# Patient Record
Sex: MALE | Race: WHITE | NOT HISPANIC OR LATINO | Employment: OTHER | URBAN - METROPOLITAN AREA
[De-identification: names, ages, dates, MRNs, and addresses within clinical notes are randomized per-mention and may not be internally consistent; named-entity substitution may affect disease eponyms.]

---

## 2017-01-10 ENCOUNTER — APPOINTMENT (OUTPATIENT)
Dept: SLEEP MEDICINE | Facility: MEDICAL CENTER | Age: 72
End: 2017-01-10
Attending: INTERNAL MEDICINE

## 2017-01-12 ENCOUNTER — TELEPHONE (OUTPATIENT)
Dept: CARDIOLOGY | Facility: MEDICAL CENTER | Age: 72
End: 2017-01-12

## 2017-01-12 NOTE — TELEPHONE ENCOUNTER
I called the patient at [phone] (H) and left a voicemail asking the patient if he has had recent labs done (as per visit with Dr. Reina) for his upcoming appointment with [facility] 1/17/17 @ 1400 check-in. I asked the patient to call me back with this information.LESLIE.

## 2017-12-09 ENCOUNTER — APPOINTMENT (OUTPATIENT)
Dept: RADIOLOGY | Facility: MEDICAL CENTER | Age: 72
DRG: 291 | End: 2017-12-09
Attending: EMERGENCY MEDICINE
Payer: MEDICARE

## 2017-12-09 ENCOUNTER — HOSPITAL ENCOUNTER (INPATIENT)
Facility: MEDICAL CENTER | Age: 72
LOS: 1 days | DRG: 291 | End: 2017-12-12
Attending: EMERGENCY MEDICINE | Admitting: INTERNAL MEDICINE
Payer: MEDICARE

## 2017-12-09 ENCOUNTER — RESOLUTE PROFESSIONAL BILLING HOSPITAL PROF FEE (OUTPATIENT)
Dept: HOSPITALIST | Facility: MEDICAL CENTER | Age: 72
End: 2017-12-09

## 2017-12-09 DIAGNOSIS — I27.82 OTHER CHRONIC PULMONARY EMBOLISM WITHOUT ACUTE COR PULMONALE (HCC): ICD-10-CM

## 2017-12-09 DIAGNOSIS — I26.99 OTHER ACUTE PULMONARY EMBOLISM WITHOUT ACUTE COR PULMONALE (HCC): ICD-10-CM

## 2017-12-09 DIAGNOSIS — E78.5 DYSLIPIDEMIA: ICD-10-CM

## 2017-12-09 DIAGNOSIS — R06.02 SOB (SHORTNESS OF BREATH): ICD-10-CM

## 2017-12-09 DIAGNOSIS — I50.9 CONGESTIVE HEART FAILURE, UNSPECIFIED CONGESTIVE HEART FAILURE CHRONICITY, UNSPECIFIED CONGESTIVE HEART FAILURE TYPE: ICD-10-CM

## 2017-12-09 LAB
ALBUMIN SERPL BCP-MCNC: 3.8 G/DL (ref 3.2–4.9)
ALBUMIN/GLOB SERPL: 1.9 G/DL
ALP SERPL-CCNC: 61 U/L (ref 30–99)
ALT SERPL-CCNC: 63 U/L (ref 2–50)
ANION GAP SERPL CALC-SCNC: 11 MMOL/L (ref 0–11.9)
AST SERPL-CCNC: 27 U/L (ref 12–45)
BASOPHILS # BLD AUTO: 0.5 % (ref 0–1.8)
BASOPHILS # BLD: 0.05 K/UL (ref 0–0.12)
BILIRUB SERPL-MCNC: 1.9 MG/DL (ref 0.1–1.5)
BNP SERPL-MCNC: 2513 PG/ML (ref 0–100)
BUN SERPL-MCNC: 30 MG/DL (ref 8–22)
CALCIUM SERPL-MCNC: 8.9 MG/DL (ref 8.5–10.5)
CHLORIDE SERPL-SCNC: 108 MMOL/L (ref 96–112)
CO2 SERPL-SCNC: 22 MMOL/L (ref 20–33)
CREAT SERPL-MCNC: 1.24 MG/DL (ref 0.5–1.4)
DEPRECATED D DIMER PPP IA-ACNC: 325 NG/ML(D-DU)
EKG IMPRESSION: NORMAL
EOSINOPHIL # BLD AUTO: 0.1 K/UL (ref 0–0.51)
EOSINOPHIL NFR BLD: 1 % (ref 0–6.9)
ERYTHROCYTE [DISTWIDTH] IN BLOOD BY AUTOMATED COUNT: 44.5 FL (ref 35.9–50)
FLUAV RNA SPEC QL NAA+PROBE: NEGATIVE
FLUBV RNA SPEC QL NAA+PROBE: NEGATIVE
GFR SERPL CREATININE-BSD FRML MDRD: 58 ML/MIN/1.73 M 2
GLOBULIN SER CALC-MCNC: 2 G/DL (ref 1.9–3.5)
GLUCOSE SERPL-MCNC: 185 MG/DL (ref 65–99)
HCT VFR BLD AUTO: 48.8 % (ref 42–52)
HGB BLD-MCNC: 16.7 G/DL (ref 14–18)
IMM GRANULOCYTES # BLD AUTO: 0.05 K/UL (ref 0–0.11)
IMM GRANULOCYTES NFR BLD AUTO: 0.5 % (ref 0–0.9)
LIPASE SERPL-CCNC: 47 U/L (ref 11–82)
LYMPHOCYTES # BLD AUTO: 2.54 K/UL (ref 1–4.8)
LYMPHOCYTES NFR BLD: 25.6 % (ref 22–41)
MCH RBC QN AUTO: 30.9 PG (ref 27–33)
MCHC RBC AUTO-ENTMCNC: 34.2 G/DL (ref 33.7–35.3)
MCV RBC AUTO: 90.4 FL (ref 81.4–97.8)
MONOCYTES # BLD AUTO: 0.75 K/UL (ref 0–0.85)
MONOCYTES NFR BLD AUTO: 7.6 % (ref 0–13.4)
NEUTROPHILS # BLD AUTO: 6.44 K/UL (ref 1.82–7.42)
NEUTROPHILS NFR BLD: 64.8 % (ref 44–72)
NRBC # BLD AUTO: 0 K/UL
NRBC BLD AUTO-RTO: 0 /100 WBC
PLATELET # BLD AUTO: 175 K/UL (ref 164–446)
PMV BLD AUTO: 11.2 FL (ref 9–12.9)
POTASSIUM SERPL-SCNC: 4.1 MMOL/L (ref 3.6–5.5)
PROT SERPL-MCNC: 5.8 G/DL (ref 6–8.2)
RBC # BLD AUTO: 5.4 M/UL (ref 4.7–6.1)
SODIUM SERPL-SCNC: 141 MMOL/L (ref 135–145)
TROPONIN I SERPL-MCNC: 0.21 NG/ML (ref 0–0.04)
TROPONIN I SERPL-MCNC: 0.24 NG/ML (ref 0–0.04)
WBC # BLD AUTO: 9.9 K/UL (ref 4.8–10.8)

## 2017-12-09 PROCEDURE — 99285 EMERGENCY DEPT VISIT HI MDM: CPT

## 2017-12-09 PROCEDURE — 90471 IMMUNIZATION ADMIN: CPT

## 2017-12-09 PROCEDURE — 36415 COLL VENOUS BLD VENIPUNCTURE: CPT

## 2017-12-09 PROCEDURE — 700117 HCHG RX CONTRAST REV CODE 255: Performed by: EMERGENCY MEDICINE

## 2017-12-09 PROCEDURE — 99220 PR INITIAL OBSERVATION CARE,LEVL III: CPT | Performed by: INTERNAL MEDICINE

## 2017-12-09 PROCEDURE — 83880 ASSAY OF NATRIURETIC PEPTIDE: CPT

## 2017-12-09 PROCEDURE — G0378 HOSPITAL OBSERVATION PER HR: HCPCS

## 2017-12-09 PROCEDURE — 90670 PCV13 VACCINE IM: CPT | Performed by: INTERNAL MEDICINE

## 2017-12-09 PROCEDURE — 85025 COMPLETE CBC W/AUTO DIFF WBC: CPT

## 2017-12-09 PROCEDURE — 80053 COMPREHEN METABOLIC PANEL: CPT

## 2017-12-09 PROCEDURE — 700111 HCHG RX REV CODE 636 W/ 250 OVERRIDE (IP): Performed by: INTERNAL MEDICINE

## 2017-12-09 PROCEDURE — 700101 HCHG RX REV CODE 250: Performed by: EMERGENCY MEDICINE

## 2017-12-09 PROCEDURE — 87502 INFLUENZA DNA AMP PROBE: CPT

## 2017-12-09 PROCEDURE — 83690 ASSAY OF LIPASE: CPT

## 2017-12-09 PROCEDURE — 700102 HCHG RX REV CODE 250 W/ 637 OVERRIDE(OP): Performed by: INTERNAL MEDICINE

## 2017-12-09 PROCEDURE — 71010 DX-CHEST-PORTABLE (1 VIEW): CPT

## 2017-12-09 PROCEDURE — 71275 CT ANGIOGRAPHY CHEST: CPT

## 2017-12-09 PROCEDURE — 94640 AIRWAY INHALATION TREATMENT: CPT

## 2017-12-09 PROCEDURE — 93005 ELECTROCARDIOGRAM TRACING: CPT | Performed by: EMERGENCY MEDICINE

## 2017-12-09 PROCEDURE — 85379 FIBRIN DEGRADATION QUANT: CPT

## 2017-12-09 PROCEDURE — A9270 NON-COVERED ITEM OR SERVICE: HCPCS | Performed by: INTERNAL MEDICINE

## 2017-12-09 PROCEDURE — 84484 ASSAY OF TROPONIN QUANT: CPT

## 2017-12-09 PROCEDURE — 96374 THER/PROPH/DIAG INJ IV PUSH: CPT

## 2017-12-09 RX ORDER — IPRATROPIUM BROMIDE AND ALBUTEROL SULFATE 2.5; .5 MG/3ML; MG/3ML
3 SOLUTION RESPIRATORY (INHALATION)
Status: DISCONTINUED | OUTPATIENT
Start: 2017-12-09 | End: 2017-12-12 | Stop reason: HOSPADM

## 2017-12-09 RX ORDER — BISACODYL 10 MG
10 SUPPOSITORY, RECTAL RECTAL
Status: DISCONTINUED | OUTPATIENT
Start: 2017-12-09 | End: 2017-12-12 | Stop reason: HOSPADM

## 2017-12-09 RX ORDER — GUAIFENESIN 600 MG/1
600 TABLET, EXTENDED RELEASE ORAL 2 TIMES DAILY PRN
COMMUNITY

## 2017-12-09 RX ORDER — ATORVASTATIN CALCIUM 40 MG/1
40 TABLET, FILM COATED ORAL
Status: DISCONTINUED | OUTPATIENT
Start: 2017-12-09 | End: 2017-12-12 | Stop reason: HOSPADM

## 2017-12-09 RX ORDER — FUROSEMIDE 80 MG
40 TABLET ORAL EVERY MORNING
COMMUNITY

## 2017-12-09 RX ORDER — AMOXICILLIN 500 MG/1
200 CAPSULE ORAL 2 TIMES DAILY
Status: ON HOLD | COMMUNITY
End: 2017-12-12

## 2017-12-09 RX ORDER — ENALAPRIL MALEATE 10 MG/1
20 TABLET ORAL EVERY 12 HOURS
Status: DISCONTINUED | OUTPATIENT
Start: 2017-12-09 | End: 2017-12-10

## 2017-12-09 RX ORDER — FUROSEMIDE 10 MG/ML
40 INJECTION INTRAMUSCULAR; INTRAVENOUS
Status: DISCONTINUED | OUTPATIENT
Start: 2017-12-09 | End: 2017-12-12

## 2017-12-09 RX ORDER — CARVEDILOL 25 MG/1
25 TABLET ORAL 2 TIMES DAILY WITH MEALS
Status: DISCONTINUED | OUTPATIENT
Start: 2017-12-09 | End: 2017-12-10

## 2017-12-09 RX ORDER — POLYETHYLENE GLYCOL 3350 17 G/17G
1 POWDER, FOR SOLUTION ORAL
Status: DISCONTINUED | OUTPATIENT
Start: 2017-12-09 | End: 2017-12-12 | Stop reason: HOSPADM

## 2017-12-09 RX ORDER — ASPIRIN 300 MG/1
300 SUPPOSITORY RECTAL DAILY
Status: DISCONTINUED | OUTPATIENT
Start: 2017-12-09 | End: 2017-12-09

## 2017-12-09 RX ORDER — AMOXICILLIN 250 MG
2 CAPSULE ORAL 2 TIMES DAILY
Status: DISCONTINUED | OUTPATIENT
Start: 2017-12-09 | End: 2017-12-12 | Stop reason: HOSPADM

## 2017-12-09 RX ORDER — FUROSEMIDE 10 MG/ML
20 INJECTION INTRAMUSCULAR; INTRAVENOUS ONCE
Status: DISCONTINUED | OUTPATIENT
Start: 2017-12-09 | End: 2017-12-09

## 2017-12-09 RX ORDER — POTASSIUM CHLORIDE 20 MEQ/1
20 TABLET, EXTENDED RELEASE ORAL DAILY
Status: DISCONTINUED | OUTPATIENT
Start: 2017-12-10 | End: 2017-12-12

## 2017-12-09 RX ORDER — ASPIRIN 325 MG
325 TABLET ORAL DAILY
Status: DISCONTINUED | OUTPATIENT
Start: 2017-12-09 | End: 2017-12-09

## 2017-12-09 RX ORDER — ASPIRIN 81 MG/1
324 TABLET, CHEWABLE ORAL DAILY
Status: DISCONTINUED | OUTPATIENT
Start: 2017-12-09 | End: 2017-12-09

## 2017-12-09 RX ADMIN — IPRATROPIUM BROMIDE AND ALBUTEROL SULFATE 3 ML: .5; 3 SOLUTION RESPIRATORY (INHALATION) at 15:29

## 2017-12-09 RX ADMIN — ASPIRIN 325 MG: 325 TABLET, COATED ORAL at 20:09

## 2017-12-09 RX ADMIN — PNEUMOCOCCAL 13-VALENT CONJUGATE VACCINE 0.5 ML: 2.2; 2.2; 2.2; 2.2; 2.2; 4.4; 2.2; 2.2; 2.2; 2.2; 2.2; 2.2; 2.2 INJECTION, SUSPENSION INTRAMUSCULAR at 21:53

## 2017-12-09 RX ADMIN — ATORVASTATIN CALCIUM 40 MG: 40 TABLET, FILM COATED ORAL at 20:09

## 2017-12-09 RX ADMIN — FUROSEMIDE 40 MG: 10 INJECTION, SOLUTION INTRAMUSCULAR; INTRAVENOUS at 17:03

## 2017-12-09 RX ADMIN — IOHEXOL 65 ML: 350 INJECTION, SOLUTION INTRAVENOUS at 19:16

## 2017-12-09 RX ADMIN — CARVEDILOL 25 MG: 25 TABLET, FILM COATED ORAL at 20:09

## 2017-12-09 RX ADMIN — ENALAPRIL MALEATE 20 MG: 10 TABLET ORAL at 20:09

## 2017-12-09 RX ADMIN — ENOXAPARIN SODIUM 100 MG: 100 INJECTION SUBCUTANEOUS at 21:53

## 2017-12-09 ASSESSMENT — COGNITIVE AND FUNCTIONAL STATUS - GENERAL
DAILY ACTIVITIY SCORE: 24
SUGGESTED CMS G CODE MODIFIER MOBILITY: CH
SUGGESTED CMS G CODE MODIFIER DAILY ACTIVITY: CH
MOBILITY SCORE: 24

## 2017-12-09 ASSESSMENT — ENCOUNTER SYMPTOMS
CONSTIPATION: 0
WEAKNESS: 0
FALLS: 0
TREMORS: 0
NAUSEA: 0
WHEEZING: 0
DIARRHEA: 0
COUGH: 0
HEADACHES: 0
NERVOUS/ANXIOUS: 0
EYE PAIN: 0
MYALGIAS: 0
INSOMNIA: 0
PALPITATIONS: 0
FEVER: 0
VOMITING: 0
SHORTNESS OF BREATH: 1
LOSS OF CONSCIOUSNESS: 0
HEMOPTYSIS: 0
DIZZINESS: 0
ABDOMINAL PAIN: 0
FOCAL WEAKNESS: 0
SEIZURES: 0
BLOOD IN STOOL: 0
EYE REDNESS: 0
CHILLS: 0
ORTHOPNEA: 1

## 2017-12-09 ASSESSMENT — LIFESTYLE VARIABLES
HOW MANY TIMES IN THE PAST YEAR HAVE YOU HAD 5 OR MORE DRINKS IN A DAY: 0
CONSUMPTION TOTAL: NEGATIVE
TOTAL SCORE: 0
TOTAL SCORE: 0
HAVE PEOPLE ANNOYED YOU BY CRITICIZING YOUR DRINKING: NO
DO YOU DRINK ALCOHOL: YES
TOTAL SCORE: 0
EVER_SMOKED: YES
AVERAGE NUMBER OF DAYS PER WEEK YOU HAVE A DRINK CONTAINING ALCOHOL: 1
HAVE YOU EVER FELT YOU SHOULD CUT DOWN ON YOUR DRINKING: NO
EVER HAD A DRINK FIRST THING IN THE MORNING TO STEADY YOUR NERVES TO GET RID OF A HANGOVER: NO
ON A TYPICAL DAY WHEN YOU DRINK ALCOHOL HOW MANY DRINKS DO YOU HAVE: 1
EVER FELT BAD OR GUILTY ABOUT YOUR DRINKING: NO

## 2017-12-09 ASSESSMENT — PATIENT HEALTH QUESTIONNAIRE - PHQ9
SUM OF ALL RESPONSES TO PHQ QUESTIONS 1-9: 0
2. FEELING DOWN, DEPRESSED, IRRITABLE, OR HOPELESS: NOT AT ALL
1. LITTLE INTEREST OR PLEASURE IN DOING THINGS: NOT AT ALL
SUM OF ALL RESPONSES TO PHQ9 QUESTIONS 1 AND 2: 0

## 2017-12-09 ASSESSMENT — PAIN SCALES - GENERAL
PAINLEVEL_OUTOF10: 3
PAINLEVEL_OUTOF10: 0

## 2017-12-09 NOTE — FLOWSHEET NOTE
12/09/17 1531   Interdisciplinary Plan of Care-Goals (Indications)   Obstructive Ventilatory Defect or Pulmonary Disease without Obvious Obstruction History / Diagnosis   Interdisciplinary Plan of Care-Outcomes    Bronchodilator Outcome Improved Vital Signs and Measures of Gas Exchange   Education   Education Yes - Pt. / Family has been Instructed in use of Respiratory Equipment   RT Assessment of Delivered Medications   Evaluation of Medication Delivery Daily Yes-- Pt /Family has been Instructed in use of Respiratory Medications and Adverse Reactions   SVN Group   #SVN Performed Yes   Given By: Mouthpiece   Date SVN Last Changed 12/09/17   Date SVN Next Change Due (Q 7 Days) 12/16/17   Respiratory WDL   Respiratory (WDL) X   Chest Exam   Respiration (!) 26   Pulse (!) 107   Heart Rate (Monitored) (!) 110   Breath Sounds   RUL Breath Sounds Clear   RML Breath Sounds Clear   RLL Breath Sounds Clear   JEREMY Breath Sounds Clear   LLL Breath Sounds Clear   Oxygen   Pulse Oximetry 94 %   O2 (LPM) 0   O2 (FiO2) 21   O2 Daily Delivery Respiratory  Room Air with O2 Available

## 2017-12-09 NOTE — ED NOTES
"Pt ambulates to triage  Chief Complaint   Patient presents with   • Congestion     no relief with mucinex - not wheezing   • Hoarse   • Abdominal Pain     \"upset\" x 3-4 days   Pt asked to wait in lobby, pt updated on triage process and pt asked to inform RN of any changes.     "

## 2017-12-09 NOTE — ED PROVIDER NOTES
"ED Provider Note    CHIEF COMPLAINT  Chief Complaint   Patient presents with   • Congestion     no relief with mucinex - not wheezing   • Hoarse   • Abdominal Pain     \"upset\" x 3-4 days       HPI  Vasiliy Rm is a 72 y.o. male who presents withShortness of breath has been going on for 1 week now. He has history of COPD and congestive heart failure. He's had this shortness of breath in the past unsure which cause is causing it today. He has dyspnea on exertion no chest pain no new arm or neck or back pain but his legs feel tired. He has a nonproductive cough no fever no chills no nausea vomiting currently but he was nausea for 2 days in a row. He also developed midabdominal pain for the past 4-5 days it's achy type pain intermittent S makes any better or worse he's had before. Currently he is pain-free. He also has some increased leg swelling. No new leg pain. All other systems are negative    REVIEW OF SYSTEMS  See HPI for further details    PAST MEDICAL HISTORY  Past Medical History:   Diagnosis Date   • Chickenpox    • COPD (chronic obstructive pulmonary disease) (CMS-HCC)    • Hyperlipidemia    • Hypertension    • Sleep apnea    • Smallpox        FAMILY HISTORY  Family History   Problem Relation Age of Onset   • Sleep Apnea Brother        SOCIAL HISTORY  Social History     Social History   • Marital status:      Spouse name: N/A   • Number of children: N/A   • Years of education: N/A     Social History Main Topics   • Smoking status: Former Smoker     Packs/day: 0.50     Years: 5.00     Types: Cigarettes, Pipe, Cigars     Quit date: 1/1/1972   • Smokeless tobacco: Never Used   • Alcohol use 0.0 - 1.2 oz/week      Comment: once a week   • Drug use: No   • Sexual activity: Not on file     Other Topics Concern   • Not on file     Social History Narrative   • No narrative on file       SURGICAL HISTORY  Past Surgical History:   Procedure Laterality Date   • OTHER ORTHOPEDIC SURGERY      thumb   • " "TONSILLECTOMY         CURRENT MEDICATIONS  Home Medications    **Home medications have not yet been reviewed for this encounter**         ALLERGIES  Allergies   Allergen Reactions   • Broccoli [Brassica Oleracea Italica (Broccoli)] Contact Dermatitis       PHYSICAL EXAM  VITAL SIGNS: /108   Pulse (!) 120   Temp 36.2 °C (97.2 °F)   Resp 20   Ht 1.778 m (5' 10\")   Wt 102.3 kg (225 lb 8.5 oz)   SpO2 96%   BMI 32.36 kg/m²     Constitutional: Patient is alert and oriented x3 inMild to moderate respiratory distress   HENT: Moist mucous membranes  Eyes: No conjunctivitis or icterus  Neck: Trachea is midline  Lymphatic: Negative anterior cervical adenopathy  Cardiovascular: Tachycardic regular no murmur  Thorax & Lungs: Scattered rhonchi  Abdomen: Morbidly obese nontender he does have what appears to be a right lower umbilical hernia unreducible he said it for years  Neurologic: Normal motor sensation  Extremities: 1-2+ pretibial edema  Psychiatric: Affect normal, Judgment normal, Mood normal.     EKG: Normal sinus rhythm rate of 107 with a normal corrected QT interval QRS leftward axis T wave inversion anterolaterally nondiagnostic ST elevation anteriorly EKG is unchanged from previous done in September 2016    Results for orders placed or performed during the hospital encounter of 12/09/17   CBC WITH DIFFERENTIAL   Result Value Ref Range    WBC 9.9 4.8 - 10.8 K/uL    RBC 5.40 4.70 - 6.10 M/uL    Hemoglobin 16.7 14.0 - 18.0 g/dL    Hematocrit 48.8 42.0 - 52.0 %    MCV 90.4 81.4 - 97.8 fL    MCH 30.9 27.0 - 33.0 pg    MCHC 34.2 33.7 - 35.3 g/dL    RDW 44.5 35.9 - 50.0 fL    Platelet Count 175 164 - 446 K/uL    MPV 11.2 9.0 - 12.9 fL    Neutrophils-Polys 64.80 44.00 - 72.00 %    Lymphocytes 25.60 22.00 - 41.00 %    Monocytes 7.60 0.00 - 13.40 %    Eosinophils 1.00 0.00 - 6.90 %    Basophils 0.50 0.00 - 1.80 %    Immature Granulocytes 0.50 0.00 - 0.90 %    Nucleated RBC 0.00 /100 WBC    Neutrophils (Absolute) " 6.44 1.82 - 7.42 K/uL    Lymphs (Absolute) 2.54 1.00 - 4.80 K/uL    Monos (Absolute) 0.75 0.00 - 0.85 K/uL    Eos (Absolute) 0.10 0.00 - 0.51 K/uL    Baso (Absolute) 0.05 0.00 - 0.12 K/uL    Immature Granulocytes (abs) 0.05 0.00 - 0.11 K/uL    NRBC (Absolute) 0.00 K/uL   COMP METABOLIC PANEL   Result Value Ref Range    Sodium 141 135 - 145 mmol/L    Potassium 4.1 3.6 - 5.5 mmol/L    Chloride 108 96 - 112 mmol/L    Co2 22 20 - 33 mmol/L    Anion Gap 11.0 0.0 - 11.9    Glucose 185 (H) 65 - 99 mg/dL    Bun 30 (H) 8 - 22 mg/dL    Creatinine 1.24 0.50 - 1.40 mg/dL    Calcium 8.9 8.5 - 10.5 mg/dL    AST(SGOT) 27 12 - 45 U/L    ALT(SGPT) 63 (H) 2 - 50 U/L    Alkaline Phosphatase 61 30 - 99 U/L    Total Bilirubin 1.9 (H) 0.1 - 1.5 mg/dL    Albumin 3.8 3.2 - 4.9 g/dL    Total Protein 5.8 (L) 6.0 - 8.2 g/dL    Globulin 2.0 1.9 - 3.5 g/dL    A-G Ratio 1.9 g/dL   TROPONIN   Result Value Ref Range    Troponin I 0.24 (H) 0.00 - 0.04 ng/mL   LIPASE   Result Value Ref Range    Lipase 47 11 - 82 U/L   BTYPE NATRIURETIC PEPTIDE   Result Value Ref Range    B Natriuretic Peptide 2513 (H) 0 - 100 pg/mL   ESTIMATED GFR   Result Value Ref Range    GFR If African American >60 >60 mL/min/1.73 m 2    GFR If Non African American 58 (A) >60 mL/min/1.73 m 2   INFLUENZA A/B BY PCR   Result Value Ref Range    Influenza virus A RNA Negative Negative    Influenza virus B, PCR Negative Negative   D-DIMER   Result Value Ref Range    D-Dimer Screen 325 (H) <250 ng/mL(D-DU)   Troponin in four (4) hours   Result Value Ref Range    Troponin I 0.21 (H) 0.00 - 0.04 ng/mL   EKG (NOW)   Result Value Ref Range    Report       [facility] Emergency Dept.    Test Date:  2017  Pt Name:    JONATHAN DOLL              Department: ER  MRN:        [id number]                      Room:       [facility]  Gender:     M                            Technician: [id number]  :        [id number]                   Requested By:LONNIE MARTEL  Order #:     [id number]                    Reading MD:    Measurements  Intervals                                Axis  Rate:       107                          P:          94  ME:         180                          QRS:        -36  QRSD:       102                          T:          71  QT:         356  QTc:        475    Interpretive Statements  SINUS TACHYCARDIA  MULTIPLE ATRIAL PREMATURE COMPLEXES  PROBABLE LEFT ATRIAL ABNORMALITY  LEFT AXIS DEVIATION  LATE PRECORDIAL R/S TRANSITION  Compared to ECG 09/09/2016 08:29:09  Atrial premature complex(es) now present  Left-axis deviation now present  Sinus rhythm no longer present  Ventricular premature complex(es) no jeison aspen present  T-wave abnormality no longer present  Possible ischemia no longer present          CT-CTA CHEST PULMONARY ARTERY W/ RECONS   Final Result      Nonocclusive right lower lobe segmental pulmonary embolism with no evidence right heart strain.  This is age indeterminate, could be acute or subacute      Moderate left heart enlargement      Small-medium sized right pleural effusion      Findings were discussed with LONNIE MARTEL on 12/9/2017 8:01 PM.      DX-CHEST-PORTABLE (1 VIEW)   Final Result      Bilateral atelectasis and enlarged cardiac silhouette      ECHOCARDIOGRAM COMP W/O CONT    (Results Pending)   LE VENOUS DUPLEX - DVT ([facility] Only)    (Results Pending)           COURSE & MEDICAL DECISION MAKING  Pertinent Labs & Imaging studies reviewed. (See chart for details)  Patient presented with shortness of breath and wheezing he had minimal improvement. Patient's  BNP is markedly elevated 2500 with a elevated troponin 0.25. Cardiology consultation was obtained. Patient has a previously known decreased ejection fraction. Contacted Dr. Acuna medicine and seen the patient. Chest x-ray shows minimal congestion therefore a CTA was obtained that shows a subsegmental acute or subacute PE. I did contact the hospitalist and informed him  of that finding. Patient has already been admitted at that point.    FINAL IMPRESSION  1.   1. SOB (shortness of breath)    2. Congestive heart failure, unspecified congestive heart failure chronicity, unspecified congestive heart failure type (CMS-HCC)        2.   3.         Electronically signed by: Orlin Beltran, 12/9/2017 2:32 PM

## 2017-12-10 LAB
ANION GAP SERPL CALC-SCNC: 4 MMOL/L (ref 0–11.9)
BUN SERPL-MCNC: 26 MG/DL (ref 8–22)
CALCIUM SERPL-MCNC: 8.8 MG/DL (ref 8.5–10.5)
CHLORIDE SERPL-SCNC: 108 MMOL/L (ref 96–112)
CO2 SERPL-SCNC: 28 MMOL/L (ref 20–33)
CREAT SERPL-MCNC: 1.14 MG/DL (ref 0.5–1.4)
ERYTHROCYTE [DISTWIDTH] IN BLOOD BY AUTOMATED COUNT: 45.4 FL (ref 35.9–50)
GFR SERPL CREATININE-BSD FRML MDRD: >60 ML/MIN/1.73 M 2
GLUCOSE SERPL-MCNC: 185 MG/DL (ref 65–99)
HCT VFR BLD AUTO: 45.4 % (ref 42–52)
HGB BLD-MCNC: 14.7 G/DL (ref 14–18)
MCH RBC QN AUTO: 29.5 PG (ref 27–33)
MCHC RBC AUTO-ENTMCNC: 32.4 G/DL (ref 33.7–35.3)
MCV RBC AUTO: 91.2 FL (ref 81.4–97.8)
PLATELET # BLD AUTO: 168 K/UL (ref 164–446)
PMV BLD AUTO: 11.3 FL (ref 9–12.9)
POTASSIUM SERPL-SCNC: 4.2 MMOL/L (ref 3.6–5.5)
RBC # BLD AUTO: 4.98 M/UL (ref 4.7–6.1)
SODIUM SERPL-SCNC: 140 MMOL/L (ref 135–145)
TROPONIN I SERPL-MCNC: 0.15 NG/ML (ref 0–0.04)
TROPONIN I SERPL-MCNC: 0.16 NG/ML (ref 0–0.04)
WBC # BLD AUTO: 9.2 K/UL (ref 4.8–10.8)

## 2017-12-10 PROCEDURE — A9270 NON-COVERED ITEM OR SERVICE: HCPCS | Performed by: INTERNAL MEDICINE

## 2017-12-10 PROCEDURE — 85027 COMPLETE CBC AUTOMATED: CPT

## 2017-12-10 PROCEDURE — 90471 IMMUNIZATION ADMIN: CPT

## 2017-12-10 PROCEDURE — 36415 COLL VENOUS BLD VENIPUNCTURE: CPT

## 2017-12-10 PROCEDURE — 700102 HCHG RX REV CODE 250 W/ 637 OVERRIDE(OP): Performed by: NURSE PRACTITIONER

## 2017-12-10 PROCEDURE — 700111 HCHG RX REV CODE 636 W/ 250 OVERRIDE (IP): Performed by: INTERNAL MEDICINE

## 2017-12-10 PROCEDURE — 93970 EXTREMITY STUDY: CPT

## 2017-12-10 PROCEDURE — 3E0234Z INTRODUCTION OF SERUM, TOXOID AND VACCINE INTO MUSCLE, PERCUTANEOUS APPROACH: ICD-10-PCS | Performed by: INTERNAL MEDICINE

## 2017-12-10 PROCEDURE — 99225 PR SUBSEQUENT OBSERVATION CARE,LEVEL II: CPT | Performed by: INTERNAL MEDICINE

## 2017-12-10 PROCEDURE — A9270 NON-COVERED ITEM OR SERVICE: HCPCS | Performed by: NURSE PRACTITIONER

## 2017-12-10 PROCEDURE — G0378 HOSPITAL OBSERVATION PER HR: HCPCS

## 2017-12-10 PROCEDURE — 84484 ASSAY OF TROPONIN QUANT: CPT | Mod: 91

## 2017-12-10 PROCEDURE — 93970 EXTREMITY STUDY: CPT | Mod: 26 | Performed by: SURGERY

## 2017-12-10 PROCEDURE — 90662 IIV NO PRSV INCREASED AG IM: CPT | Performed by: INTERNAL MEDICINE

## 2017-12-10 PROCEDURE — 700102 HCHG RX REV CODE 250 W/ 637 OVERRIDE(OP): Performed by: INTERNAL MEDICINE

## 2017-12-10 PROCEDURE — 80048 BASIC METABOLIC PNL TOTAL CA: CPT

## 2017-12-10 RX ORDER — ADENOSINE 3 MG/ML
INJECTION, SOLUTION INTRAVENOUS
Status: DISCONTINUED
Start: 2017-12-10 | End: 2017-12-10

## 2017-12-10 RX ORDER — LISINOPRIL 5 MG/1
10 TABLET ORAL
Status: DISCONTINUED | OUTPATIENT
Start: 2017-12-10 | End: 2017-12-12

## 2017-12-10 RX ORDER — CARVEDILOL 3.12 MG/1
3.12 TABLET ORAL 2 TIMES DAILY WITH MEALS
Status: DISCONTINUED | OUTPATIENT
Start: 2017-12-10 | End: 2017-12-11

## 2017-12-10 RX ADMIN — ENOXAPARIN SODIUM 100 MG: 100 INJECTION SUBCUTANEOUS at 09:13

## 2017-12-10 RX ADMIN — INFLUENZA A VIRUSA/MICHIGAN/45/2015 X-275 (H1N1) ANTIGEN (FORMALDEHYDE INACTIVATED), INFLUENZA A VIRUS A/HONG KONG/4801/2014 X-263B (H3N2) ANTIGEN (FORMALDEHYDE INACTIVATED), AND INFLUENZA B VIRUS B/BRISBANE/60/2008 ANTIGEN (FORMALDEHYDE INACTIVATED) 0.5 ML: 60; 60; 60 INJECTION, SUSPENSION INTRAMUSCULAR at 06:10

## 2017-12-10 RX ADMIN — FUROSEMIDE 40 MG: 10 INJECTION, SOLUTION INTRAMUSCULAR; INTRAVENOUS at 09:14

## 2017-12-10 RX ADMIN — POTASSIUM CHLORIDE 20 MEQ: 1500 TABLET, EXTENDED RELEASE ORAL at 09:13

## 2017-12-10 RX ADMIN — ASPIRIN 81 MG: 81 TABLET, COATED ORAL at 09:13

## 2017-12-10 RX ADMIN — ATORVASTATIN CALCIUM 40 MG: 40 TABLET, FILM COATED ORAL at 21:00

## 2017-12-10 RX ADMIN — ENOXAPARIN SODIUM 100 MG: 100 INJECTION SUBCUTANEOUS at 21:00

## 2017-12-10 RX ADMIN — LISINOPRIL 10 MG: 5 TABLET ORAL at 09:13

## 2017-12-10 RX ADMIN — CARVEDILOL 3.12 MG: 3.12 TABLET, FILM COATED ORAL at 09:13

## 2017-12-10 RX ADMIN — STANDARDIZED SENNA CONCENTRATE AND DOCUSATE SODIUM 2 TABLET: 8.6; 5 TABLET, FILM COATED ORAL at 21:00

## 2017-12-10 RX ADMIN — CARVEDILOL 3.12 MG: 3.12 TABLET, FILM COATED ORAL at 17:00

## 2017-12-10 RX ADMIN — STANDARDIZED SENNA CONCENTRATE AND DOCUSATE SODIUM 2 TABLET: 8.6; 5 TABLET, FILM COATED ORAL at 09:13

## 2017-12-10 ASSESSMENT — ENCOUNTER SYMPTOMS
SHORTNESS OF BREATH: 1
ORTHOPNEA: 1
MYALGIAS: 0
DIZZINESS: 0
VOMITING: 0
NAUSEA: 0
FEVER: 0
PALPITATIONS: 0
COUGH: 0
CLAUDICATION: 0
NECK PAIN: 0
CHILLS: 0
PND: 0
HEADACHES: 0

## 2017-12-10 ASSESSMENT — LIFESTYLE VARIABLES
TOTAL SCORE: 0
HOW MANY TIMES IN THE PAST YEAR HAVE YOU HAD 5 OR MORE DRINKS IN A DAY: 0
ON A TYPICAL DAY WHEN YOU DRINK ALCOHOL HOW MANY DRINKS DO YOU HAVE: 1
TOTAL SCORE: 0
EVER FELT BAD OR GUILTY ABOUT YOUR DRINKING: NO
HAVE YOU EVER FELT YOU SHOULD CUT DOWN ON YOUR DRINKING: NO
HAVE PEOPLE ANNOYED YOU BY CRITICIZING YOUR DRINKING: NO
CONSUMPTION TOTAL: NEGATIVE
DO YOU DRINK ALCOHOL: YES
EVER HAD A DRINK FIRST THING IN THE MORNING TO STEADY YOUR NERVES TO GET RID OF A HANGOVER: NO
TOTAL SCORE: 0
AVERAGE NUMBER OF DAYS PER WEEK YOU HAVE A DRINK CONTAINING ALCOHOL: 1

## 2017-12-10 ASSESSMENT — PATIENT HEALTH QUESTIONNAIRE - PHQ9
SUM OF ALL RESPONSES TO PHQ QUESTIONS 1-9: 0
SUM OF ALL RESPONSES TO PHQ9 QUESTIONS 1 AND 2: 0
SUM OF ALL RESPONSES TO PHQ QUESTIONS 1-9: 0
2. FEELING DOWN, DEPRESSED, IRRITABLE, OR HOPELESS: NOT AT ALL
1. LITTLE INTEREST OR PLEASURE IN DOING THINGS: NOT AT ALL
2. FEELING DOWN, DEPRESSED, IRRITABLE, OR HOPELESS: NOT AT ALL
SUM OF ALL RESPONSES TO PHQ9 QUESTIONS 1 AND 2: 0
1. LITTLE INTEREST OR PLEASURE IN DOING THINGS: NOT AT ALL

## 2017-12-10 ASSESSMENT — COPD QUESTIONNAIRES
DURING THE PAST 4 WEEKS HOW MUCH DID YOU FEEL SHORT OF BREATH: NONE/LITTLE OF THE TIME
DO YOU EVER COUGH UP ANY MUCUS OR PHLEGM?: NO/ONLY WITH OCCASIONAL COLDS OR INFECTIONS
COPD SCREENING SCORE: 2
HAVE YOU SMOKED AT LEAST 100 CIGARETTES IN YOUR ENTIRE LIFE: NO/DON'T KNOW

## 2017-12-10 ASSESSMENT — PAIN SCALES - GENERAL
PAINLEVEL_OUTOF10: 0

## 2017-12-10 ASSESSMENT — COGNITIVE AND FUNCTIONAL STATUS - GENERAL
SUGGESTED CMS G CODE MODIFIER DAILY ACTIVITY: CH
MOBILITY SCORE: 24
DAILY ACTIVITIY SCORE: 24
SUGGESTED CMS G CODE MODIFIER MOBILITY: CH

## 2017-12-10 NOTE — RESPIRATORY CARE
COPD EDUCATION by COPD CLINICAL EDUCATOR  12/10/2017 at 6:21 AM by Niki Andre     Patient reviewed by COPD education team. Patient does not qualify for COPD program.

## 2017-12-10 NOTE — ASSESSMENT & PLAN NOTE
CTA chest showing PE,   After discussion today with pt regarding cost of xarelto, he is agreeable of paying for it, so will start him on xarelto   LE duplex neg for DVT

## 2017-12-10 NOTE — PROGRESS NOTES
Monitor Summary: SR/ST, HR: 86 - 112, BRIDGETT: 0.16, QRS: 0.08, QTI: 0.38; Frequent PVCs, couplets, trigeminy

## 2017-12-10 NOTE — PROGRESS NOTES
[facility] Hospitalist Progress Note    Date of Service: 12/10/2017    Chief Complaint  72 y.o. male admitted 2017 with shortness of breath, found to have acute on chronic CHF and also PE.     Interval Problem Update  Pt seen and examined, afebrile, no acute events overnight, cardiology following appreciate rec. Regarding his PE, on Lovenox, Sw working to see if insurance will cover xarelto.     Consultants/Specialty  Cardiology     Disposition  TBD        Review of Systems   Constitutional: Negative for chills and fever.   Respiratory: Positive for shortness of breath. Negative for cough.    Cardiovascular: Negative for chest pain.   Gastrointestinal: Negative for nausea and vomiting.   Genitourinary: Negative for dysuria.   Musculoskeletal: Negative for myalgias and neck pain.   Skin: Negative for rash.   Neurological: Negative for dizziness and headaches.      Physical Exam  Laboratory/Imaging   Hemodynamics  Temp (24hrs), Av.5 °C (97.7 °F), Min:36.4 °C (97.6 °F), Max:36.6 °C (97.9 °F)   Temperature: 36.6 °C (97.8 °F)  Pulse  Av.2  Min: 73  Max: 120 Heart Rate (Monitored): (!) 110  Blood Pressure : 123/92, NIBP: 157/113      Respiratory      Respiration: 18, Pulse Oximetry: 97 %, O2 Daily Delivery Respiratory : Room Air with O2 Available     Given By:: Mouthpiece  RUL Breath Sounds: Clear, RML Breath Sounds: Diminished, RLL Breath Sounds: Diminished, JEREMY Breath Sounds: Clear, LLL Breath Sounds: Diminished    Fluids    Intake/Output Summary (Last 24 hours) at 12/10/17 1220  Last data filed at 12/10/17 0600   Gross per 24 hour   Intake              740 ml   Output                0 ml   Net              740 ml       Nutrition  Orders Placed This Encounter   Procedures   • Diet Order     Standing Status:   Standing     Number of Occurrences:   1     Order Specific Question:   Diet:     Answer:   Cardiac [6]     Order Specific Question:   Consistency/Fluid modifications:     Answer:   2000 ml Fluid  Restriction [11]     Physical Exam   Constitutional: He is oriented to person, place, and time.   HENT:   Head: Normocephalic and atraumatic.   Eyes: Conjunctivae are normal. No scleral icterus.   Neck: Neck supple. No JVD present.   Cardiovascular: Normal rate.    No murmur heard.  Pulmonary/Chest: Breath sounds normal. He has no wheezes. He has no rales.   Abdominal: Soft. Bowel sounds are normal. He exhibits no distension. There is no tenderness.   Neurological: He is alert and oriented to person, place, and time.   Skin: Skin is warm and dry.   Nursing note and vitals reviewed.      Recent Labs      12/09/17   1440  12/10/17   0033   WBC  9.9  9.2   RBC  5.40  4.98   HEMOGLOBIN  16.7  14.7   HEMATOCRIT  48.8  45.4   MCV  90.4  91.2   MCH  30.9  29.5   MCHC  34.2  32.4*   RDW  44.5  45.4   PLATELETCT  175  168   MPV  11.2  11.3     Recent Labs      12/09/17   1440  12/10/17   0033   SODIUM  141  140   POTASSIUM  4.1  4.2   CHLORIDE  108  108   CO2  22  28   GLUCOSE  185*  185*   BUN  30*  26*   CREATININE  1.24  1.14   CALCIUM  8.9  8.8         Recent Labs      12/09/17   1440   BNPBTYPENAT  2513*              Assessment/Plan     * Acute exacerbation of CHF (congestive heart failure) (CMS-HCC)   Assessment & Plan    Acute on chronic systolic CHF   SOB, DINERO for 3 days, lower extremity edema (chronic)  Echo EF 35% in 2016  Cath last year clean per wife and patient, they follow Dr. Stevens  Diuretics  Cardiology following appreciate rec.   Echo pending         Pulmonary embolus (CMS-HCC)   Assessment & Plan    CTA chest showing PE, started on lovenox  Sw to see if xarelto can be covered by his insurance.   LE duplex pending         Sleep disorder- (present on admission)   Assessment & Plan    Has not been formally checked for ARISTEO yet.  Ordered nocturnal oximetry        COPD (chronic obstructive pulmonary disease) (CMS-HCC)- (present on admission)   Assessment & Plan    No wheezing, seems stable.  O2 and resp per  protocol        Dyslipidemia- (present on admission)   Assessment & Plan    COntinue statin        Elevated troponin- (present on admission)   Assessment & Plan    Mild. Trended    Reportedly last cardiac cath was clean  probably related to his PE             Reviewed items::  Labs reviewed, Medications reviewed and Radiology images reviewed  Degroot catheter::  No Degroot  DVT prophylaxis pharmacological::  Enoxaparin (Lovenox)

## 2017-12-10 NOTE — CARE PLAN
Problem: Infection  Goal: Will remain free from infection    Intervention: Assess signs and symptoms of infection  Monitoring lab values, Vs, s/s of infection, will continue to monitor.

## 2017-12-10 NOTE — ED NOTES
Med rec complete per pt at bedside  Allergies reviewed  Pt finished a 7 day course of Amoxicillin over a week ago   Pt was also taking Coreg 25mg BID and Enalapril 20mg BID, but states he has been out for over a month

## 2017-12-10 NOTE — CARE PLAN
Problem: Bowel/Gastric:  Goal: Normal bowel function is maintained or improved    Intervention: Educate patient and significant other/support system about diet, fluid intake, medications and activity to promote bowel function  Pt edu provided, pt stated understanding, will continue to monitor.

## 2017-12-10 NOTE — PROGRESS NOTES
Cardiology Progress Note               Author: Feliberto White   Date & Time created: 12/10/2017  7:32 AM   Consultation for acute on chronic decompensated HF    Admitted with dyspnea on exertion, leg swelling  ×3 days    History of EF 35% in 2016, non obstructive CAD per CATH in , hypertension, hyperlipidemia, COPD, sleep apnea, not on CPAP      Labs reviewed  Troponin peaked at 0.24  BNP 2500    Blood pressure =124/94  Heart rate =88 NSR     Review of Systems   Constitutional: Positive for malaise/fatigue.   Respiratory: Positive for shortness of breath. Negative for cough.    Cardiovascular: Positive for orthopnea and leg swelling. Negative for chest pain, palpitations, claudication and PND.       Physical Exam   Constitutional: He is oriented to person, place, and time.   HENT:   Head: Normocephalic.   Eyes: Conjunctivae are normal.   Neck: No JVD present. No thyromegaly present.   Cardiovascular: Normal rate and regular rhythm.    Pulses:       Carotid pulses are 2+ on the right side, and 2+ on the left side.       Radial pulses are 2+ on the right side, and 2+ on the left side.   Pulmonary/Chest: He has no wheezes.   Abdominal: Soft.   Musculoskeletal: He exhibits edema.   Neurological: He is alert and oriented to person, place, and time.   Skin: Skin is warm and dry.       Hemodynamics:  Temp (24hrs), Av.4 °C (97.6 °F), Min:36.2 °C (97.2 °F), Max:36.6 °C (97.9 °F)  Temperature: 36.4 °C (97.6 °F)  Pulse  Av  Min: 73  Max: 120Heart Rate (Monitored): (!) 110  Blood Pressure : 124/94, NIBP: 157/113     Respiratory:    Respiration: 18, Pulse Oximetry: 97 %, O2 Daily Delivery Respiratory : Room Air with O2 Available     Given By:: Mouthpiece  RUL Breath Sounds: Clear, RML Breath Sounds: Clear, RLL Breath Sounds: Clear, JEREMY Breath Sounds: Clear, LLL Breath Sounds: Clear  Fluids:     Weight: 102.3 kg (225 lb 8.5 oz)  GI/Nutrition:  Orders Placed This Encounter   Procedures   • Diet Order     Standing  Status:   Standing     Number of Occurrences:   1     Order Specific Question:   Diet:     Answer:   Cardiac [6]     Order Specific Question:   Consistency/Fluid modifications:     Answer:   2000 ml Fluid Restriction [11]     Lab Results:  Recent Labs      12/09/17   1440  12/10/17   0033   WBC  9.9  9.2   RBC  5.40  4.98   HEMOGLOBIN  16.7  14.7   HEMATOCRIT  48.8  45.4   MCV  90.4  91.2   MCH  30.9  29.5   MCHC  34.2  32.4*   RDW  44.5  45.4   PLATELETCT  175  168   MPV  11.2  11.3     Recent Labs      12/09/17 1440  12/10/17   0033   SODIUM  141  140   POTASSIUM  4.1  4.2   CHLORIDE  108  108   CO2  22  28   GLUCOSE  185*  185*   BUN  30*  26*   CREATININE  1.24  1.14   CALCIUM  8.9  8.8         Recent Labs      12/09/17   1440   BNPBTYPENAT  2513*     Recent Labs      12/09/17   1440  12/09/17   2005  12/10/17   0033  12/10/17   0546   TROPONINI  0.24*  0.21*  0.16*  0.15*   BNPBTYPENAT  2513*   --    --    --              Medical Decision Making, by Problem:  Active Hospital Problems    Diagnosis   • *Acute exacerbation of CHF (congestive heart failure) (CMS-[facility]) [I50.9]   • Pulmonary embolus (CMS-[facility]) [I26.99]   • COPD (chronic obstructive pulmonary disease) (CMS-[facility]) [J44.9]   • Dyslipidemia [E78.5]   • Sleep disorder [G47.9]   • Elevated troponin [R74.8]       Assessment/Plan:    CTA chest 12/9/17 non-occlusive right lower lobe segmental PE, on therapeutic Lovenox    Venous doppler study of LE pending     DDimer 325    Acute on chronic systolic and diastolic HF , BNP 2500, on IV Lasix 40 mg    Echo pending     Will hold BB for now     Strict I/O    No rhythm issues overnight     Reviewed items::  Medications reviewed and Labs reviewed

## 2017-12-10 NOTE — CONSULTS
DATE OF SERVICE:  12/09/2017    REFERRING PHYSICIAN:  Orlin Beltran MD    CHIEF COMPLAINT:  Acute on chronic congestive heart failure with dilated   cardiomyopathy.    HISTORY OF PRESENT ILLNESS:  The patient is a 72-year-old male with past   medical history significant for congestive heart failure, newly reduced left   ventricular systolic function diagnosed per echocardiogram on 09/21/16 as   compared to normal left ventricular systolic function on echocardiogram of   05/19/13, nonobstructive coronary artery disease per cardiac catheterization   by Dr. Madhav Jay on 9/24/12, hypertension, hyperlipidemia, COPD, sleep   apnea, not on CPAP therapy and medical noncompliance.    The patient is followed in our office by Dr. Vasiliy Reina.  However, he   has not been seen by Dr. Reina since 09/21/16.  He was well until about 6   months ago when he began experiencing worsening lower extremity edema.  For   the last month, he has had progressive shortness of breath and decreased   appetite.  Denies chest pain, nausea, vomiting or diaphoresis.  He was brought   to [facility] Emergency Room and was found to be in volume   overloaded state.  He was started on IV diuretic therapy.    ALLERGIES:  No known drug allergies.    MEDICATIONS:  Aspirin 325 mg per day, atorvastatin 40 mg at bedtime,   carvedilol 25 mg b.i.d., enalapril 20 mg b.i.d., Lovenox 40 mg subQ,   furosemide 40 mg IV daily, K-Dur 20 mEq daily.    PAST MEDICAL ILLNESS:  As above plus smallpox, chickenpox.    PAST SURGICAL HISTORY:  Cardiac catheterization as above, tonsillectomy.    SOCIAL HISTORY:  He is  and accompanied by his wife.  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  Previous tobacco abuse.    FAMILY HISTORY:  No significant family history for coronary artery disease.    REVIEW OF SYSTEMS:  Positive for fatigue, shortness of breath, dyspnea on   exertion, lack of appetite, lower extremity edema.  GENERAL: The patient denies  fever, chills or weight changes.   HEENT: The patient denies headache, visual or hearing changes.   CENTRAL NERVOUS SYSTEM: The patient denies lightheadedness,   syncope or seizures.   ENDOCRINE: The patient denies thyroid disorder or diabetes.   RESPIRATORY: The patient denies productive cough, asthma or emphysema.  CARDIOVASCULAR: As above.   GASTROINTESTINAL: The patient denies bowel changes.   GENITOURINARY: The patient denies urinary changes.   PSYCHIATRIC: The patient denies depression or anxiety.   EXTREMITIES: The patient denies arthritis.    PHYSICAL EXAMINATION:  VITAL SIGNS:  Include pulse 74, respiration 19, /108, temperature 36.2.  GENERAL:  No acute distress.  HEENT:  Eyes equal, oral moist.  NECK:  Supple.  RESPIRATORY:  Clear to auscultation bilaterally.  Good effort.  CARDIOVASCULAR:  S1, S2.  Regular tachycardia without significant murmur.  ABDOMEN:  Soft, nondistended, nontender.  No hepatosplenomegaly noted.  EXTREMITIES:  Upper extremities, no clubbing, cyanosis.  Lower extremity, 3-4+   edema.  NEUROLOGIC:  Alert and oriented x3.  PSYCHIATRIC:  No anxiety or depression.  SKIN:  Warm and dry.    CARDIAC STUDIES/PROCEDURES:    CARDIAC CATHETERIZATION CONCLUSIONS by Dr. Jay (09/24/12)  1.  Somewhat diffuse disease with no focal high-grade lesions except for possibly the superior branch of the second obtuse marginal.   The LAD itself has a fairly lengthy area of diffuse eccentric disease that appears to be 40-50%.    The circumflex vessel is nondominant and gives rise to a very proximal first obtuse marginal, which is small, and a second branched obtuse marginal that has a very inferior distribution.  The more superior branch has an ostial stenosis of 60-70%.   The PDA itself is quite a large vessel as well in its proximal to mid transition.  There is a focal area of 40-50%   2.  Moderate-to-severely reduced systolic function.  EF is measured at 26%   3.  Elevated end diastolic  pressure.    CAROTID ULTRASOUND (05/13/17)  1.  Hemodynalmically normal bilateral carotid bulb and internal carotid    arterial stenosis.   2.  Normal bilateral subclavian and vertebral arterial exam.    ECHOCARDIOGRAM CONCLUSIONS (09/21/16)  Moderately reduced left ventricular systolic function.  Left ventricular ejection fraction is visually estimated to be 35%.  Severe concentric left ventricular hypertrophy.  Moderately dilated left atrium.  Mild aortic insufficiency.  Moderate mitral regurgitation.  Unable to estimate pulmonary artery pressure due to an inadequate   tricuspid regurgitant jet.    ECHOCARDIOGRAM CONCLUSIONS (05/19/13)  Normal left ventricular systolic function.   Left ventricular ejection fraction is 60% to 65%.  Grade I diastolic dysfunction is present.  Mild concentric left ventricular hypertrophy.  Mild mitral regurgitation.  Mild aortic insufficiency.  Mild pulmonic insufficiency.    EKG performed on (12/09/17) was reviewed: EKG shows sinus tachycardia with poor R wave progression and non-specific intra-ventricular conduction delay.    Laboratory results of (12/09/17) were reviewed. Bun of 30 mg/dl, creatinine levels of 1.24 mg/dl noted.    ASSESSMENT AND PLAN:  1.  Acute on chronic systolic congestive heart failure:  The patient is a   72-year-old male with past medical history significant for congestive heart   failure and dilated cardiomyopathy with nearly reduced left ventricular   systolic function diagnosed a year ago.  Since then, he has not followed up   with Dr. Reina.  He presents with shortness of breath and is in volume   overloaded state.  He has been started on IV diuretic therapy.  The patient   will be continued with diuresis, we will perform an echocardiogram and we will   repeat a cardiac catheterization to assess for progression of his coronary   artery disease as a cause of his left ventricular dysfunction when his volume   status has improved.  The patient  understands the risks and benefits and   agrees with plan.  2.  Coronary artery disease:  Plan as above.  3.  Hypertension:  Blood pressure is elevated.  He has been restarted on   medications.  4.  Hyperlipidemia, on statin therapy.  5.  Chronic obstructive pulmonary disease.  6.  Sleep apnea.  7.  Medical noncompliance.       ____________________________________     MD SADIE DAVID / TANNER    DD:  12/09/2017 17:34:02  DT:  12/09/2017 18:25:50    D#:  [id number]  Job#:  [id number]

## 2017-12-10 NOTE — ASSESSMENT & PLAN NOTE
Acute on chronic systolic CHF   SOB, DINERO for 3 days, lower extremity edema (chronic)  Echo EF 35% in 2016  Cath last year clean per wife and patient, they follow Dr. Stevens

## 2017-12-10 NOTE — PROGRESS NOTES
Sheri Alarcon Fall Risk Assessment:     Last Known Fall: No falls  Mobility: No limitations  Medications: Cardiovascular and central nervous system meds, Diuretics  Mental Status/LOC/Awareness: Awake, alert, and oriented to date, place, and person  Toileting Needs: No needs  Volume/Electrolyte Status: No problems  Communication/Sensory: Visual (Glasses)/hearing deficit  Behavior: Appropriate behavior  Sheri Alarcon Fall Risk Total: 9  Fall Risk Level: LOW RISK    Universal Fall Precautions:  call light/belongings in reach, bed in low position and locked, wheelchairs and assistive devices out of sight, siderails up x 2, use non-slip footwear, adequate lighting, clutter free and spill free environment, educate on level of risk, educate to call for assistance    Fall Risk Level Interventions:   TRIAL (TELE 8, NEURO, MED ALEXA 5) Low Fall Risk Interventions  Place yellow fall risk ID band on patient: refused  Provide patient/family education based on risk assessment: completed  Educate patient/family to call staff for assistance when getting out of bed: completed  Place fall precaution signage outside patient door: completed      Patient Specific Interventions:     Medication: not applicable  Mental Status/LOC/Awareness: reinforce falls education and reinforce the use of call light  Toileting: instruct patient/family on the need to call for assistance when toileting  Volume/Electrolyte Status: monitor abnormal lab values  Communication/Sensory: not applicable  Behavioral: not applicable  Mobility: not applicable

## 2017-12-10 NOTE — H&P
"Hospital Medicine History and Physical    Date of Service  12/9/2017    Chief Complaint  Chief Complaint   Patient presents with   • Congestion     no relief with mucinex - not wheezing   • Hoarse   • Abdominal Pain     \"upset\" x 3-4 days       History of Presenting Illness  72 y.o. male who presented 12/9/2017 with shortness of breath, dyspnea on exertion and leg swelling.  3 days now of DINERO and SOB, no wheezing but he mentions hard to take a deep breath. No cough. Did not complain of abdominal discomfort to me, however he mentioned may have feeling of stomach upset/fullness/swelling. He denied chest pain, lightheadedness. No nausea, diarrhea or dysuria. In the past he was seen by Dr. Stevens and had an echo a year ago with EF 35%. He and his wife mentioned a cath was done and it was clean, I couldn;t retrieve the records myself from Epic. No further follow up or follow up echo to find out the cause of this.   At the ED, afebrile, hemodynamically stable. CXR clear but cardiomegaly. Troponin came back slightly elevated, BNP also elevated. I instructed ED phsyician to consult Cardiology and obtain a CTA chest.  When I saw him at ED, no acute distress. He said he felt better. He however is on oxygen. Crackles at the bases. Murmur noted. Bilateral LE edema.  Primary Care Physician  Vasiliy Reina M.D.    Consultants  Cardiology    Code Status      Review of Systems  Review of Systems   Constitutional: Negative for chills and fever.   HENT: Negative for congestion, hearing loss and nosebleeds.    Eyes: Negative for pain and redness.   Respiratory: Positive for shortness of breath. Negative for cough, hemoptysis and wheezing.    Cardiovascular: Positive for orthopnea and leg swelling. Negative for chest pain and palpitations.   Gastrointestinal: Negative for abdominal pain, blood in stool, constipation, diarrhea, nausea and vomiting.   Genitourinary: Negative for dysuria, frequency and hematuria. "   Musculoskeletal: Negative for falls, joint pain and myalgias.   Skin: Negative for rash.   Neurological: Negative for dizziness, tremors, focal weakness, seizures, loss of consciousness, weakness and headaches.   Psychiatric/Behavioral: The patient is not nervous/anxious and does not have insomnia.    All other systems reviewed and are negative.       Past Medical History  Past Medical History:   Diagnosis Date   • Chickenpox    • COPD (chronic obstructive pulmonary disease) (CMS-[facility])    • Hyperlipidemia    • Hypertension    • Sleep apnea    • Smallpox        Surgical History  Past Surgical History:   Procedure Laterality Date   • OTHER ORTHOPEDIC SURGERY      thumb   • TONSILLECTOMY         Medications  No current facility-administered medications on file prior to encounter.      Current Outpatient Prescriptions on File Prior to Encounter   Medication Sig Dispense Refill   • aspirin 81 MG tablet Take 81 mg by mouth every morning.     • potassium chloride SA (KLOR-CON M20) 20 MEQ Tab CR Take 1 Tab by mouth every day. Needs to be seen for further refills. Thank you 90 Tab 3   • atorvastatin (LIPITOR) 40 MG Tab Take 1 Tab by mouth every bedtime. Needs to be seen for further refills. Thank you 90 Tab 3       Family History  Family History   Problem Relation Age of Onset   • Sleep Apnea Brother        Social History  Social History   Substance Use Topics   • Smoking status: Former Smoker     Packs/day: 0.50     Years: 5.00     Types: Cigarettes, Pipe, Cigars     Quit date: 1972   • Smokeless tobacco: Never Used   • Alcohol use 0.0 - 1.2 oz/week      Comment: once a week       Allergies  Allergies   Allergen Reactions   • Broccoli [Brassica Oleracea Italica (Broccoli)] Hives        Physical Exam  Laboratory   Hemodynamics  Temp (24hrs), Av.4 °C (97.6 °F), Min:36.2 °C (97.2 °F), Max:36.6 °C (97.9 °F)   Temperature: 36.6 °C (97.9 °F)  Pulse  Av.3  Min: 73  Max: 120 Heart Rate (Monitored): (!) 110  Blood  Pressure : 154/105, NIBP: 157/113      Respiratory      Respiration: 12, Pulse Oximetry: 93 %, O2 Daily Delivery Respiratory : Room Air with O2 Available     Given By:: Mouthpiece  RUL Breath Sounds: Clear, RML Breath Sounds: Clear, RLL Breath Sounds: Clear, JEREMY Breath Sounds: Clear, LLL Breath Sounds: Clear    Physical Exam   Constitutional: He appears well-developed and well-nourished.   HENT:   Head: Normocephalic and atraumatic.   Eyes: Conjunctivae and EOM are normal. No scleral icterus.   Neck: Normal range of motion. Neck supple.   Cardiovascular: Normal rate and regular rhythm.  Exam reveals no gallop and no friction rub.    No murmur heard.  Pulmonary/Chest: Effort normal. No respiratory distress. He has no wheezes. He has rales (minor bibasilar crackles).   Abdominal: Soft. Bowel sounds are normal. He exhibits no distension. There is no tenderness. There is no rebound and no guarding.   Musculoskeletal: He exhibits edema. He exhibits no tenderness.   Neurological: He is alert.   Skin: Skin is warm.   Psychiatric: He has a normal mood and affect. His behavior is normal.       Recent Labs      12/09/17   1440   WBC  9.9   RBC  5.40   HEMOGLOBIN  16.7   HEMATOCRIT  48.8   MCV  90.4   MCH  30.9   MCHC  34.2   RDW  44.5   PLATELETCT  175   MPV  11.2     Recent Labs      12/09/17   1440   SODIUM  141   POTASSIUM  4.1   CHLORIDE  108   CO2  22   GLUCOSE  185*   BUN  30*   CREATININE  1.24   CALCIUM  8.9     Recent Labs      12/09/17   1440   ALTSGPT  63*   ASTSGOT  27   ALKPHOSPHAT  61   TBILIRUBIN  1.9*   LIPASE  47   GLUCOSE  185*         Recent Labs      12/09/17   1440   BNPBTYPENAT  2513*         Lab Results   Component Value Date    TROPONINI 0.21 (H) 12/09/2017     Urinalysis:    Lab Results  Component Value Date/Time   SPECGRAVITY 1.013 09/24/2012 0100   GLUCOSEUR Negative 09/24/2012 0100   KETONES Negative 09/24/2012 0100   NITRITE Negative 09/24/2012 0100        Imaging  Dx-chest-portable (1  View)    Result Date: 12/9/2017 12/9/2017 4:14 PM HISTORY/REASON FOR EXAM:  Shortness of Breath. Chest congestion and hoarseness voice TECHNIQUE/EXAM DESCRIPTION AND NUMBER OF VIEWS: Single portable view of the chest. COMPARISON: 1 view chest 5/18/2013 FINDINGS: The lungs show linear density that is consistent with atelectasis. Cardiac silhouette: enlarged. Pleura: There are no pleural effusion or pneumothoraces. Osseous structures: No significant bony abnormality is present.     Bilateral atelectasis and enlarged cardiac silhouette    Ct-cta Chest Pulmonary Artery W/ Recons    Result Date: 12/9/2017 12/9/2017 7:07 PM HISTORY/REASON FOR EXAM:  Shortness of breath TECHNIQUE/EXAM DESCRIPTION: CT angiogram scan for pulmonary embolism with contrast, with reconstructions. 1.25 mm helical sections were obtained from the lung apices through the lung bases following the rapid bolus administration of 65 mL of Omnipaque 350 nonionic contrast. Thin-section overlapping reconstruction interval was utilized. Coronal reconstructions were generated from the axial data. MIP post processing was performed and utilized for the interpretation. Low dose optimization technique was utilized for this CT exam including automated exposure control and adjustment of the mA and/or kV according to patient size. COMPARISON: None FINDINGS: Pulmonary Embolism: No. Main Pulmonary Arteries: No. Segmental branches: Right lower lobe nonocclusive medial basal filling defect is seen. RV/LV ratio: <1 (Greater than 1.3 is abnormal.) There is moderate left heart enlargement Lungs: Right lower lobe compressive atelectasis is favored over infarction. Pleura: Small-medium-sized right pleural effusion layers dependently. Nodes: No enlarged lymph nodes. Additional findings: No adrenal mass is seen.     Nonocclusive right lower lobe segmental pulmonary embolism with no evidence right heart strain.  This is age indeterminate, could be acute or subacute Moderate  left heart enlargement Small-medium sized right pleural effusion Findings were discussed with LONNIE MARTEL on 12/9/2017 8:01 PM.     Assessment/Plan     I anticipate this patient will require at least two midnights for appropriate medical management, necessitating inpatient admission.    * Acute exacerbation of CHF (congestive heart failure) (CMS-HCC)   Assessment & Plan    SOB, DINERO for 3 days, lower extremity edema (chronic)  Echo EF 35% in 2016  Cath last year clean per wife and patient, they follow Dr. Stevens  No further follow up from them regarding cause of heart failure  Obtain Ddimer and   Diuretics  Cardiology consulted in the ED.        Pulmonary embolus (CMS-HCC)   Assessment & Plan    I ordered Ddimer and recommended CTA Chest to be obtained  That showed segmental PE  Echo already ordered. Will get LE Duplex. Start him on full anticoagulation Lovenox. Can decide on direct thrombin inhibitors vs warfarin depending on echo results.        Sleep disorder- (present on admission)   Assessment & Plan    Has not been formally checked for ARISTEO yet.  Ordered nocturnal oximetry        COPD (chronic obstructive pulmonary disease) (CMS-HCC)- (present on admission)   Assessment & Plan    No wheezing, seems stable.  O2 and resp per protocol        Dyslipidemia- (present on admission)   Assessment & Plan    COntinue statin        Elevated troponin- (present on admission)   Assessment & Plan    Mild.   Trend troponin, telemetry, Cardiology consulted. Reportedly last cardiac cath was clean  Ordered Ddimer and CTA chest; came back with small PE, not sure if this is the culprit.            VTE prophylaxis: now on Lovenox full anticoagulation.    I spent 82 minutes, reviewing the chart, notes, vitals, labs, imaging, ordering labs, evaluating Vasiliy Rm for assessment, enacting the plan above. 50% of the time was spent in counseling Vasiliy Rm and wife answering questions, long discussion. Discussed with ED  physician. Manged patient at ED extensively as well. Medical decision making is therefore complex. Time was devoted to counseling and coordinating care including review of records, pertinent lab data and studies, as well as discussing diagnostic evaluation and work up, planned therapeutic interventions and future disposition of care. Where indicated, the assessment and plan reflect discussion of patient with consultants, other healthcare providers, family members, and additional research needed to obtain further information in formulating the plan of care for Vasiliy Rm.

## 2017-12-10 NOTE — DISCHARGE PLANNING
ROMY faxed Xarelto scripts to [facility] pharmacy on [facility] as requested. Awaiting return call regarding prior auth/cost.     Re-sent at 12:30 as pharmacy states they did not receive the fax.

## 2017-12-11 ENCOUNTER — PATIENT OUTREACH (OUTPATIENT)
Dept: HEALTH INFORMATION MANAGEMENT | Facility: OTHER | Age: 72
End: 2017-12-11

## 2017-12-11 ENCOUNTER — TELEPHONE (OUTPATIENT)
Dept: VASCULAR LAB | Facility: MEDICAL CENTER | Age: 72
End: 2017-12-11

## 2017-12-11 DIAGNOSIS — I27.82 OTHER CHRONIC PULMONARY EMBOLISM WITHOUT ACUTE COR PULMONALE (HCC): ICD-10-CM

## 2017-12-11 LAB
ANION GAP SERPL CALC-SCNC: 7 MMOL/L (ref 0–11.9)
BUN SERPL-MCNC: 29 MG/DL (ref 8–22)
CALCIUM SERPL-MCNC: 9 MG/DL (ref 8.5–10.5)
CHLORIDE SERPL-SCNC: 108 MMOL/L (ref 96–112)
CO2 SERPL-SCNC: 27 MMOL/L (ref 20–33)
CREAT SERPL-MCNC: 1.38 MG/DL (ref 0.5–1.4)
ERYTHROCYTE [DISTWIDTH] IN BLOOD BY AUTOMATED COUNT: 45.6 FL (ref 35.9–50)
GFR SERPL CREATININE-BSD FRML MDRD: 51 ML/MIN/1.73 M 2
GLUCOSE SERPL-MCNC: 150 MG/DL (ref 65–99)
HCT VFR BLD AUTO: 44.7 % (ref 42–52)
HGB BLD-MCNC: 14.6 G/DL (ref 14–18)
INR PPP: 1.12 (ref 0.87–1.13)
LV EJECT FRACT  99904: 30
LV EJECT FRACT MOD 2C 99903: 35.44
LV EJECT FRACT MOD 4C 99902: 37.16
LV EJECT FRACT MOD BP 99901: 35.52
MCH RBC QN AUTO: 30 PG (ref 27–33)
MCHC RBC AUTO-ENTMCNC: 32.7 G/DL (ref 33.7–35.3)
MCV RBC AUTO: 91.8 FL (ref 81.4–97.8)
PLATELET # BLD AUTO: 149 K/UL (ref 164–446)
PMV BLD AUTO: 11.3 FL (ref 9–12.9)
POTASSIUM SERPL-SCNC: 3.8 MMOL/L (ref 3.6–5.5)
PROTHROMBIN TIME: 14.1 SEC (ref 12–14.6)
RBC # BLD AUTO: 4.87 M/UL (ref 4.7–6.1)
SODIUM SERPL-SCNC: 142 MMOL/L (ref 135–145)
WBC # BLD AUTO: 8.7 K/UL (ref 4.8–10.8)

## 2017-12-11 PROCEDURE — 700102 HCHG RX REV CODE 250 W/ 637 OVERRIDE(OP): Performed by: INTERNAL MEDICINE

## 2017-12-11 PROCEDURE — 85027 COMPLETE CBC AUTOMATED: CPT

## 2017-12-11 PROCEDURE — A9270 NON-COVERED ITEM OR SERVICE: HCPCS | Performed by: INTERNAL MEDICINE

## 2017-12-11 PROCEDURE — A9270 NON-COVERED ITEM OR SERVICE: HCPCS | Performed by: NURSE PRACTITIONER

## 2017-12-11 PROCEDURE — 93306 TTE W/DOPPLER COMPLETE: CPT | Mod: 26 | Performed by: INTERNAL MEDICINE

## 2017-12-11 PROCEDURE — 85610 PROTHROMBIN TIME: CPT

## 2017-12-11 PROCEDURE — 80048 BASIC METABOLIC PNL TOTAL CA: CPT

## 2017-12-11 PROCEDURE — 36415 COLL VENOUS BLD VENIPUNCTURE: CPT

## 2017-12-11 PROCEDURE — 93306 TTE W/DOPPLER COMPLETE: CPT

## 2017-12-11 PROCEDURE — 700102 HCHG RX REV CODE 250 W/ 637 OVERRIDE(OP): Performed by: NURSE PRACTITIONER

## 2017-12-11 PROCEDURE — 770020 HCHG ROOM/CARE - TELE (206)

## 2017-12-11 PROCEDURE — 94762 N-INVAS EAR/PLS OXIMTRY CONT: CPT

## 2017-12-11 PROCEDURE — 700111 HCHG RX REV CODE 636 W/ 250 OVERRIDE (IP): Performed by: INTERNAL MEDICINE

## 2017-12-11 PROCEDURE — 99232 SBSQ HOSP IP/OBS MODERATE 35: CPT | Performed by: INTERNAL MEDICINE

## 2017-12-11 RX ORDER — CARVEDILOL 6.25 MG/1
6.25 TABLET ORAL 2 TIMES DAILY WITH MEALS
Status: DISCONTINUED | OUTPATIENT
Start: 2017-12-11 | End: 2017-12-12

## 2017-12-11 RX ADMIN — ASPIRIN 81 MG: 81 TABLET, COATED ORAL at 08:34

## 2017-12-11 RX ADMIN — ATORVASTATIN CALCIUM 40 MG: 40 TABLET, FILM COATED ORAL at 20:10

## 2017-12-11 RX ADMIN — POTASSIUM CHLORIDE 20 MEQ: 1500 TABLET, EXTENDED RELEASE ORAL at 08:34

## 2017-12-11 RX ADMIN — RIVAROXABAN 15 MG: 15 TABLET, FILM COATED ORAL at 20:10

## 2017-12-11 RX ADMIN — CARVEDILOL 3.12 MG: 3.12 TABLET, FILM COATED ORAL at 08:34

## 2017-12-11 RX ADMIN — CARVEDILOL 6.25 MG: 6.25 TABLET, FILM COATED ORAL at 18:05

## 2017-12-11 RX ADMIN — LISINOPRIL 10 MG: 5 TABLET ORAL at 08:34

## 2017-12-11 RX ADMIN — ENOXAPARIN SODIUM 100 MG: 100 INJECTION SUBCUTANEOUS at 08:35

## 2017-12-11 RX ADMIN — FUROSEMIDE 40 MG: 10 INJECTION, SOLUTION INTRAMUSCULAR; INTRAVENOUS at 08:34

## 2017-12-11 ASSESSMENT — ENCOUNTER SYMPTOMS
HEADACHES: 0
PALPITATIONS: 0
NAUSEA: 0
DIZZINESS: 0
FEVER: 0
NECK PAIN: 0
VOMITING: 0
COUGH: 0
ORTHOPNEA: 0
SHORTNESS OF BREATH: 1
MYALGIAS: 0
CLAUDICATION: 0
SHORTNESS OF BREATH: 0
CHILLS: 0
PND: 0

## 2017-12-11 ASSESSMENT — COGNITIVE AND FUNCTIONAL STATUS - GENERAL
MOBILITY SCORE: 24
SUGGESTED CMS G CODE MODIFIER MOBILITY: CH
SUGGESTED CMS G CODE MODIFIER DAILY ACTIVITY: CH
DAILY ACTIVITIY SCORE: 24

## 2017-12-11 ASSESSMENT — PAIN SCALES - GENERAL
PAINLEVEL_OUTOF10: 0

## 2017-12-11 NOTE — RESPIRATORY CARE
NOCTURNAL OXIMETRY STUDY / RA:     Started at 0030: pt desaturated to 88% within 30 seconds of starting the study.   0032: Sp02 to 88%  0034: Sp02 to 88%  0035: Sp02 to 89%     These desaturations were brief (less then 10 seconds each time). Sp02 would then increase into the mid to high 90s. Pt placed back onto NC after study.

## 2017-12-11 NOTE — TELEPHONE ENCOUNTER
Delivered samples Xarelto 15mg #42 BID for 3 weeks to pt at bedside.   Consulted pt on medication. Pt has [facility] number, aware that he will need an appt in 2-3 weeks, as that's when he will be due to change to Xarelto 20mg QD.     Nya Cullen, PharmD

## 2017-12-11 NOTE — PROGRESS NOTES
Sheri Alarcon Fall Risk Assessment:     Last Known Fall: No falls  Mobility: No limitations  Medications: Cardiovascular or central nervous system meds, Diuretics  Mental Status/LOC/Awareness: Awake, alert, and oriented to date, place, and person  Toileting Needs: No needs  Volume/Electrolyte Status: No problems  Communication/Sensory: Visual (Glasses)/hearing deficit  Behavior: Appropriate behavior  Sheri Alarcon Fall Risk Total: 8  Fall Risk Level: LOW RISK    Universal Fall Precautions:  call light/belongings in reach, bed in low position and locked, wheelchairs and assistive devices out of sight, siderails up x 2, use non-slip footwear, adequate lighting, clutter free and spill free environment, educate on level of risk, educate to call for assistance    Fall Risk Level Interventions:   TRIAL (TELE 8, NEURO, MED ALEXA 5) Low Fall Risk Interventions  Place yellow fall risk ID band on patient: refused  Provide patient/family education based on risk assessment: verified  Educate patient/family to call staff for assistance when getting out of bed: verified  Place fall precaution signage outside patient door: verified      Patient Specific Interventions:     Medication: review medications with patient and family, limit combination of prn medications and provide patients who received diuretics/laxatives frequent toileting  Mental Status/LOC/Awareness: reinforce falls education, reinforce the use of call light and provide activity  Toileting: monitor intake and output/use of appropriate interventions, instruct male patients prone to dizziness to void while sitting, instruct patient/family on the use of grab bars and instruct patient/family on the need to call for assistance when toileting  Volume/Electrolyte Status: ensure patient remains hydrated, advance diet as tolerated and monitor abnormal lab values  Communication/Sensory: update plan of care on whiteboard, ensure proper positioning when transferrng/ambulating and  for visually impaired patients orient to their room surrounding and do not change their surroundings  Behavioral: engage patient in daily activities, administer medication as ordered and instruct/reinforce fall program rationale  Mobility: schedule physical activity throughout the day, ensure bed is locked and in lowest position, provide appropriate assistive device and instruct patient to exit bed on their strongest side

## 2017-12-11 NOTE — PROGRESS NOTES
Cardiology Progress Note               Author: Feliberto White   Date & Time created: 2017  8:33 AM   Consultation for acute on chronic decompensated HF    Admitted with dyspnea on exertion, leg swelling  ×3 days    History of EF 35% in 2016, non obstructive CAD per CATH in , hypertension, hyperlipidemia, COPD, sleep apnea, not on CPAP      Labs reviewed  Troponin peaked at 0.24  BNP 2500 on admit   Cr 1.38 ( 1.24 on admit )    Blood pressure =124/94  Heart rate =88 NSR     Review of Systems   Constitutional: Negative for malaise/fatigue.   Respiratory: Negative for cough and shortness of breath.    Cardiovascular: Positive for leg swelling. Negative for chest pain, palpitations, orthopnea, claudication and PND.       Physical Exam   Constitutional: He is oriented to person, place, and time.   HENT:   Head: Normocephalic.   Eyes: Conjunctivae are normal.   Neck: No JVD present. No thyromegaly present.   Cardiovascular: Normal rate and regular rhythm.    Pulses:       Carotid pulses are 2+ on the right side, and 2+ on the left side.       Radial pulses are 2+ on the right side, and 2+ on the left side.   Pulmonary/Chest: He has no wheezes.   Abdominal: Soft.   Musculoskeletal: He exhibits edema.   Neurological: He is alert and oriented to person, place, and time.   Skin: Skin is warm and dry.       Hemodynamics:  Temp (24hrs), Av.3 °C (97.4 °F), Min:36.1 °C (97 °F), Max:36.6 °C (97.8 °F)  Temperature: 36.1 °C (97 °F)  Pulse  Av.2  Min: 73  Max: 120  Blood Pressure : 152/115 (nurse notified)     Respiratory:    Respiration: 16, Pulse Oximetry: 96 %     Work Of Breathing / Effort: Shallow  RUL Breath Sounds: Diminished, RML Breath Sounds: Diminished, RLL Breath Sounds: Diminished, JEREMY Breath Sounds: Diminished, LLL Breath Sounds: Diminished  Fluids:     Weight: 98.7 kg (217 lb 9.5 oz)  GI/Nutrition:  Orders Placed This Encounter   Procedures   • Diet Order     Standing Status:   Standing     Number  of Occurrences:   1     Order Specific Question:   Diet:     Answer:   Cardiac [6]     Order Specific Question:   Consistency/Fluid modifications:     Answer:   2000 ml Fluid Restriction [11]     Lab Results:  Recent Labs      12/09/17   1440  12/10/17   0033  12/11/17   0306   WBC  9.9  9.2  8.7   RBC  5.40  4.98  4.87   HEMOGLOBIN  16.7  14.7  14.6   HEMATOCRIT  48.8  45.4  44.7   MCV  90.4  91.2  91.8   MCH  30.9  29.5  30.0   MCHC  34.2  32.4*  32.7*   RDW  44.5  45.4  45.6   PLATELETCT  175  168  149*   MPV  11.2  11.3  11.3     Recent Labs      12/09/17   1440  12/10/17   0033  12/11/17   0306   SODIUM  141  140  142   POTASSIUM  4.1  4.2  3.8   CHLORIDE  108  108  108   CO2  22  28  27   GLUCOSE  185*  185*  150*   BUN  30*  26*  29*   CREATININE  1.24  1.14  1.38   CALCIUM  8.9  8.8  9.0         Recent Labs      12/09/17   1440   BNPBTYPENAT  2513*     Recent Labs      12/09/17   1440  12/09/17   2005  12/10/17   0033  12/10/17   0546   TROPONINI  0.24*  0.21*  0.16*  0.15*   BNPBTYPENAT  2513*   --    --    --              Medical Decision Making, by Problem:  Active Hospital Problems    Diagnosis   • *Acute exacerbation of CHF (congestive heart failure) (CMS-HCC) [I50.9]   • Pulmonary embolus (CMS-HCC) [I26.99]   • COPD (chronic obstructive pulmonary disease) (CMS-HCC) [J44.9]   • Dyslipidemia [E78.5]   • Sleep disorder [G47.9]   • Elevated troponin [R74.8]       Assessment/Plan:    CTA chest 12/9/17 non-occlusive right lower lobe segmental PE, on therapeutic Lovenox, on coumadin ( his copay for Xarelto is high )    Venous doppler study of LE pending     DDimer 325    Acute on chronic systolic and diastolic HF , BNP 2500, on IV Lasix 40 mg, breathing improved, LE edema persists, weight is down     Echo pending     On low dose coreg 3.125 mg BD , On lisinopril 10 mg, Lipitor 40, aspirin 81     Strict I/O, not done , discussed with RN, weight trending down on IV lasix 40 mg     Non sustained Vtach over night  12 beats run , contiue monitoring     Reviewed items::  Medications reviewed and Labs reviewed

## 2017-12-11 NOTE — PROGRESS NOTES
[facility] Hospitalist Progress Note    Date of Service: 2017    Chief Complaint  72 y.o. male admitted 2017 with shortness of breath, found to have acute on chronic CHF and also PE.     Interval Problem Update  Pt seen and examined, afebrile, no acute events overnight, cardiology following appreciate rec.   Regarding his PE, had a discussion today with pt regarding xarelto vs coumadin, per Sw his first 21 days of 15 mg BID will cost him $430, and after it will be around $80-$100. Pt states is ok  Form him and wants to be started on xarelto, so will start him on xarelto today.     Consultants/Specialty  Cardiology     Disposition  TBD        Review of Systems   Constitutional: Negative for chills and fever.   Respiratory: Positive for shortness of breath. Negative for cough.    Cardiovascular: Negative for chest pain.   Gastrointestinal: Negative for nausea and vomiting.   Genitourinary: Negative for dysuria.   Musculoskeletal: Negative for myalgias and neck pain.   Skin: Negative for rash.   Neurological: Negative for dizziness and headaches.      Physical Exam  Laboratory/Imaging   Hemodynamics  Temp (24hrs), Av.3 °C (97.4 °F), Min:36.1 °C (97 °F), Max:36.6 °C (97.8 °F)   Temperature: 36.6 °C (97.8 °F)  Pulse  Av.9  Min: 73  Max: 120   Blood Pressure : 121/84      Respiratory      Respiration: 16, Pulse Oximetry: 96 %     Work Of Breathing / Effort: Shallow  RUL Breath Sounds: Diminished, RML Breath Sounds: Diminished, RLL Breath Sounds: Diminished, JEREMY Breath Sounds: Diminished, LLL Breath Sounds: Diminished    Fluids    Intake/Output Summary (Last 24 hours) at 17 1105  Last data filed at 17 1000   Gross per 24 hour   Intake              610 ml   Output              400 ml   Net              210 ml       Nutrition  Orders Placed This Encounter   Procedures   • Diet Order     Standing Status:   Standing     Number of Occurrences:   1     Order Specific Question:   Diet:     Answer:    Cardiac [6]     Order Specific Question:   Consistency/Fluid modifications:     Answer:   2000 ml Fluid Restriction [11]     Physical Exam   Constitutional: He is oriented to person, place, and time.   HENT:   Head: Normocephalic and atraumatic.   Eyes: Conjunctivae are normal. No scleral icterus.   Neck: Neck supple. No JVD present.   Cardiovascular: Normal rate.    No murmur heard.  Pulmonary/Chest: Breath sounds normal. He has no wheezes. He has no rales.   Abdominal: Soft. Bowel sounds are normal. He exhibits no distension. There is no tenderness.   Neurological: He is alert and oriented to person, place, and time.   Skin: Skin is warm and dry.   Nursing note and vitals reviewed.      Recent Labs      12/09/17   1440  12/10/17   0033  12/11/17   0306   WBC  9.9  9.2  8.7   RBC  5.40  4.98  4.87   HEMOGLOBIN  16.7  14.7  14.6   HEMATOCRIT  48.8  45.4  44.7   MCV  90.4  91.2  91.8   MCH  30.9  29.5  30.0   MCHC  34.2  32.4*  32.7*   RDW  44.5  45.4  45.6   PLATELETCT  175  168  149*   MPV  11.2  11.3  11.3     Recent Labs      12/09/17   1440  12/10/17   0033  12/11/17   0306   SODIUM  141  140  142   POTASSIUM  4.1  4.2  3.8   CHLORIDE  108  108  108   CO2  22  28  27   GLUCOSE  185*  185*  150*   BUN  30*  26*  29*   CREATININE  1.24  1.14  1.38   CALCIUM  8.9  8.8  9.0     Recent Labs      12/11/17   0816   INR  1.12     Recent Labs      12/09/17   1440   BNPBTYPENAT  2513*              Assessment/Plan     * Acute exacerbation of CHF (congestive heart failure) (CMS-HCC)   Assessment & Plan    Acute on chronic systolic CHF   SOB, DINERO for 3 days, lower extremity edema (chronic)  Echo EF 35% in 2016  Cath last year clean per wife and patient, they follow Dr. Stevens  Cont diuretics   Cardiology following appreciate rec.   Echo pending         Pulmonary embolus (CMS-HCC)   Assessment & Plan    CTA chest showing PE,   After discussion today with pt regarding cost of xarelto, he is agreeable of paying for it, so  will start him on xarelto   LE duplex neg for DVT         Sleep disorder- (present on admission)   Assessment & Plan    Has not been formally checked for ARISTEO yet.  Ordered nocturnal oximetry        COPD (chronic obstructive pulmonary disease) (CMS-[facility])- (present on admission)   Assessment & Plan    No wheezing, seems stable.  O2 and resp per protocol        Dyslipidemia- (present on admission)   Assessment & Plan    COntinue statin        Elevated troponin- (present on admission)   Assessment & Plan    Mild. Trended    Reportedly last cardiac cath was clean  probably related to his PE             Reviewed items::  Labs reviewed, Medications reviewed and Radiology images reviewed  Degroot catheter::  No Degroot  DVT prophylaxis pharmacological::  Enoxaparin (Lovenox)

## 2017-12-11 NOTE — PROGRESS NOTES
Report received at 7:00 AM from NOC RN Lamberto, pt is sitting in the chair with no reports of pain or discomfort. Pt has no shortness of breath or labored breathing on room air. Pt is very anxious about speaking with a physician and very defensive about his acute confusion during the night. Pt's wife call prior to shift change very up set about the pt care and would like to speak to the physician. Bed is locked in lowest position with call light, belongings within reach, white board updated, and POC discussed. All needs met at this time.

## 2017-12-11 NOTE — CARE PLAN
Problem: Communication  Goal: The ability to communicate needs accurately and effectively will improve  Outcome: PROGRESSING AS EXPECTED  Pt educated on the use of the call light and television controls, and oriented to the room, restroom, and unit. Pt educated how to call staff for any issues, problems or concerns. Pt verbalized understanding of all controls, orientation, how to call for needs.     Problem: Safety  Goal: Will remain free from injury  Outcome: PROGRESSING AS EXPECTED  Pt educated on the importance of using call light before getting out of bed. Pt call appropriately and waits for hospital staff to assist with ambulation.

## 2017-12-12 VITALS
WEIGHT: 221.56 LBS | SYSTOLIC BLOOD PRESSURE: 118 MMHG | RESPIRATION RATE: 19 BRPM | DIASTOLIC BLOOD PRESSURE: 84 MMHG | OXYGEN SATURATION: 92 % | TEMPERATURE: 98.1 F | BODY MASS INDEX: 32.82 KG/M2 | HEIGHT: 69 IN | HEART RATE: 80 BPM

## 2017-12-12 PROBLEM — I50.23 ACUTE ON CHRONIC SYSTOLIC CHF (CONGESTIVE HEART FAILURE) (HCC): Status: ACTIVE | Noted: 2017-12-09

## 2017-12-12 PROBLEM — E87.6 HYPOKALEMIA: Status: ACTIVE | Noted: 2017-12-12

## 2017-12-12 LAB
ANION GAP SERPL CALC-SCNC: 9 MMOL/L (ref 0–11.9)
BUN SERPL-MCNC: 28 MG/DL (ref 8–22)
CALCIUM SERPL-MCNC: 8.4 MG/DL (ref 8.5–10.5)
CHLORIDE SERPL-SCNC: 110 MMOL/L (ref 96–112)
CO2 SERPL-SCNC: 22 MMOL/L (ref 20–33)
CREAT SERPL-MCNC: 1.03 MG/DL (ref 0.5–1.4)
ERYTHROCYTE [DISTWIDTH] IN BLOOD BY AUTOMATED COUNT: 44.8 FL (ref 35.9–50)
GFR SERPL CREATININE-BSD FRML MDRD: >60 ML/MIN/1.73 M 2
GLUCOSE SERPL-MCNC: 161 MG/DL (ref 65–99)
HCT VFR BLD AUTO: 43.5 % (ref 42–52)
HGB BLD-MCNC: 14.4 G/DL (ref 14–18)
MCH RBC QN AUTO: 30 PG (ref 27–33)
MCHC RBC AUTO-ENTMCNC: 33.1 G/DL (ref 33.7–35.3)
MCV RBC AUTO: 90.6 FL (ref 81.4–97.8)
PLATELET # BLD AUTO: 125 K/UL (ref 164–446)
PMV BLD AUTO: 11.2 FL (ref 9–12.9)
POTASSIUM SERPL-SCNC: 3.3 MMOL/L (ref 3.6–5.5)
RBC # BLD AUTO: 4.8 M/UL (ref 4.7–6.1)
SODIUM SERPL-SCNC: 141 MMOL/L (ref 135–145)
WBC # BLD AUTO: 7.2 K/UL (ref 4.8–10.8)

## 2017-12-12 PROCEDURE — 85027 COMPLETE CBC AUTOMATED: CPT

## 2017-12-12 PROCEDURE — 80048 BASIC METABOLIC PNL TOTAL CA: CPT

## 2017-12-12 PROCEDURE — 700102 HCHG RX REV CODE 250 W/ 637 OVERRIDE(OP): Performed by: NURSE PRACTITIONER

## 2017-12-12 PROCEDURE — 700102 HCHG RX REV CODE 250 W/ 637 OVERRIDE(OP): Performed by: INTERNAL MEDICINE

## 2017-12-12 PROCEDURE — A9270 NON-COVERED ITEM OR SERVICE: HCPCS | Performed by: INTERNAL MEDICINE

## 2017-12-12 PROCEDURE — A9270 NON-COVERED ITEM OR SERVICE: HCPCS | Performed by: NURSE PRACTITIONER

## 2017-12-12 PROCEDURE — 36415 COLL VENOUS BLD VENIPUNCTURE: CPT

## 2017-12-12 PROCEDURE — 700111 HCHG RX REV CODE 636 W/ 250 OVERRIDE (IP): Performed by: INTERNAL MEDICINE

## 2017-12-12 PROCEDURE — 99239 HOSP IP/OBS DSCHRG MGMT >30: CPT | Performed by: INTERNAL MEDICINE

## 2017-12-12 RX ORDER — CARVEDILOL 12.5 MG/1
12.5 TABLET ORAL 2 TIMES DAILY WITH MEALS
Status: DISCONTINUED | OUTPATIENT
Start: 2017-12-12 | End: 2017-12-12 | Stop reason: HOSPADM

## 2017-12-12 RX ORDER — LISINOPRIL 20 MG/1
20 TABLET ORAL DAILY
Qty: 30 TAB | Refills: 12 | Status: SHIPPED | OUTPATIENT
Start: 2017-12-13

## 2017-12-12 RX ORDER — POTASSIUM CHLORIDE 20 MEQ/1
40 TABLET, EXTENDED RELEASE ORAL ONCE
Status: COMPLETED | OUTPATIENT
Start: 2017-12-12 | End: 2017-12-12

## 2017-12-12 RX ORDER — LISINOPRIL 5 MG/1
10 TABLET ORAL ONCE
Status: COMPLETED | OUTPATIENT
Start: 2017-12-12 | End: 2017-12-12

## 2017-12-12 RX ORDER — ATORVASTATIN CALCIUM 40 MG/1
40 TABLET, FILM COATED ORAL DAILY
Qty: 90 TAB | Refills: 1 | Status: SHIPPED | OUTPATIENT
Start: 2017-12-12

## 2017-12-12 RX ORDER — POTASSIUM CHLORIDE 20 MEQ/1
20 TABLET, EXTENDED RELEASE ORAL 2 TIMES DAILY
Status: DISCONTINUED | OUTPATIENT
Start: 2017-12-12 | End: 2017-12-12

## 2017-12-12 RX ORDER — POTASSIUM CHLORIDE 20 MEQ/1
20 TABLET, EXTENDED RELEASE ORAL DAILY
Qty: 90 TAB | Refills: 1 | Status: SHIPPED | OUTPATIENT
Start: 2017-12-12

## 2017-12-12 RX ORDER — FUROSEMIDE 40 MG/1
40 TABLET ORAL
Status: DISCONTINUED | OUTPATIENT
Start: 2017-12-12 | End: 2017-12-12 | Stop reason: HOSPADM

## 2017-12-12 RX ORDER — POTASSIUM CHLORIDE 20 MEQ/1
40 TABLET, EXTENDED RELEASE ORAL 2 TIMES DAILY
Status: DISCONTINUED | OUTPATIENT
Start: 2017-12-12 | End: 2017-12-12

## 2017-12-12 RX ORDER — POTASSIUM CHLORIDE 20 MEQ/1
20 TABLET, EXTENDED RELEASE ORAL 2 TIMES DAILY
Status: DISCONTINUED | OUTPATIENT
Start: 2017-12-13 | End: 2017-12-12 | Stop reason: HOSPADM

## 2017-12-12 RX ORDER — LISINOPRIL 20 MG/1
20 TABLET ORAL
Status: DISCONTINUED | OUTPATIENT
Start: 2017-12-13 | End: 2017-12-12 | Stop reason: HOSPADM

## 2017-12-12 RX ORDER — CARVEDILOL 12.5 MG/1
12.5 TABLET ORAL 2 TIMES DAILY WITH MEALS
Qty: 60 TAB | Refills: 12 | Status: SHIPPED | OUTPATIENT
Start: 2017-12-12

## 2017-12-12 RX ADMIN — LISINOPRIL 10 MG: 5 TABLET ORAL at 08:19

## 2017-12-12 RX ADMIN — RIVAROXABAN 15 MG: 15 TABLET, FILM COATED ORAL at 08:19

## 2017-12-12 RX ADMIN — FUROSEMIDE 40 MG: 10 INJECTION, SOLUTION INTRAMUSCULAR; INTRAVENOUS at 08:19

## 2017-12-12 RX ADMIN — POTASSIUM CHLORIDE 20 MEQ: 1500 TABLET, EXTENDED RELEASE ORAL at 08:19

## 2017-12-12 RX ADMIN — POTASSIUM CHLORIDE 40 MEQ: 1500 TABLET, EXTENDED RELEASE ORAL at 11:38

## 2017-12-12 RX ADMIN — LISINOPRIL 10 MG: 5 TABLET ORAL at 11:15

## 2017-12-12 RX ADMIN — ASPIRIN 81 MG: 81 TABLET, COATED ORAL at 08:19

## 2017-12-12 RX ADMIN — CARVEDILOL 6.25 MG: 6.25 TABLET, FILM COATED ORAL at 08:19

## 2017-12-12 ASSESSMENT — ENCOUNTER SYMPTOMS
MYALGIAS: 0
PND: 0
ORTHOPNEA: 0
SHORTNESS OF BREATH: 0
PALPITATIONS: 0
COUGH: 0
CLAUDICATION: 0
ABDOMINAL PAIN: 0
FEVER: 0

## 2017-12-12 ASSESSMENT — PAIN SCALES - GENERAL
PAINLEVEL_OUTOF10: 0

## 2017-12-12 NOTE — CARE PLAN
"Problem: Safety  Goal: Will remain free from injury  Outcome: PROGRESSING AS EXPECTED  Placed yellow fall risk band on pt wrist; call light within reach and bed in lowest position; urinal at bedside.     Problem: Knowledge Deficit  Goal: Knowledge of disease process/condition, treatment plan, diagnostic tests, and medications will improve  Outcome: PROGRESSING AS EXPECTED  Gave pt HF packet with calendar and provided education on all points; pt made personal goal of \"seeing the sunrise outside of [place], [place]\" in six months. Pt accepted learning.       "

## 2017-12-12 NOTE — CARE PLAN
Problem: Safety  Goal: Will remain free from injury  Outcome: PROGRESSING AS EXPECTED  Call light within reach, hourly rounding in practice.

## 2017-12-12 NOTE — PROGRESS NOTES
Bedside report received by day MARY Irizarry. Patient sitting up in bed watching TV at this time. POC discussed, verbalized understanding. No immediate concerns for patient at this time. All safety measures in place.

## 2017-12-12 NOTE — PROGRESS NOTES
Cardiology Progress Note               Author: Tenisha Guthrie PONCHO   Date & Time created: 2017  10:15 AM   73 y/o male admitted for acute decompensated systolic heart failure. Hx of non-obstructive CAD with angiogram in 12, HTN, HLD, COPD, ARISTEO (no CPAP), and the above rEF of 35%.     : feeling good. Walking around. Diuresed well overnight and wants to go home.    Review of Systems   Constitutional: Negative for fever and malaise/fatigue.   Respiratory: Negative for cough and shortness of breath.    Cardiovascular: Positive for leg swelling. Negative for chest pain, palpitations, orthopnea, claudication and PND.   Gastrointestinal: Negative for abdominal pain.   Musculoskeletal: Negative for myalgias.   All other systems reviewed and are negative.      Physical Exam   Constitutional: He is oriented to person, place, and time. He appears well-developed and well-nourished. No distress.   HENT:   Head: Normocephalic and atraumatic.   Eyes: Conjunctivae are normal. Pupils are equal, round, and reactive to light.   Neck: Normal range of motion. No JVD present. No thyromegaly present.   Cardiovascular: Normal rate and regular rhythm.    No murmur heard.  Pulses:       Carotid pulses are 2+ on the right side, and 2+ on the left side.       Radial pulses are 2+ on the right side, and 2+ on the left side.   Pulmonary/Chest: Effort normal and breath sounds normal. No respiratory distress. He has no wheezes. He has no rales.   Abdominal: Soft. Bowel sounds are normal.   Musculoskeletal: Normal range of motion. He exhibits edema.   Bilateral LE edema 2+ pitting    Neurological: He is alert and oriented to person, place, and time.   Skin: Skin is warm and dry.   Psychiatric: He has a normal mood and affect.   Nursing note and vitals reviewed.      Hemodynamics:  Temp (24hrs), Av.7 °C (98 °F), Min:36.3 °C (97.4 °F), Max:37.1 °C (98.7 °F)  Temperature: 37 °C (98.6 °F)  Pulse  Av.7  Min: 64  Max: 120  Blood  Pressure : 134/95     Respiratory:    Respiration: 19, Pulse Oximetry: 95 %        RUL Breath Sounds: Diminished, RML Breath Sounds: Diminished, RLL Breath Sounds: Diminished, JEREMY Breath Sounds: Diminished, LLL Breath Sounds: Diminished  Fluids:     Weight: 100.5 kg (221 lb 9 oz)  GI/Nutrition:  Orders Placed This Encounter   Procedures   • Diet Order     Standing Status:   Standing     Number of Occurrences:   1     Order Specific Question:   Diet:     Answer:   Cardiac [6]     Order Specific Question:   Consistency/Fluid modifications:     Answer:   2000 ml Fluid Restriction [11]     Lab Results:  Recent Labs      12/10/17   0033  12/11/17   0306  12/12/17   0316   WBC  9.2  8.7  7.2   RBC  4.98  4.87  4.80   HEMOGLOBIN  14.7  14.6  14.4   HEMATOCRIT  45.4  44.7  43.5   MCV  91.2  91.8  90.6   MCH  29.5  30.0  30.0   MCHC  32.4*  32.7*  33.1*   RDW  45.4  45.6  44.8   PLATELETCT  168  149*  125*   MPV  11.3  11.3  11.2     Recent Labs      12/10/17   0033  12/11/17   0306  12/12/17   0316   SODIUM  140  142  141   POTASSIUM  4.2  3.8  3.3*   CHLORIDE  108  108  110   CO2  28  27  22   GLUCOSE  185*  150*  161*   BUN  26*  29*  28*   CREATININE  1.14  1.38  1.03   CALCIUM  8.8  9.0  8.4*     Recent Labs      12/11/17   0816   INR  1.12     Recent Labs      12/09/17   1440   BNPBTYPENAT  2513*     Recent Labs      12/09/17   1440  12/09/17   2005  12/10/17   0033  12/10/17   0546   TROPONINI  0.24*  0.21*  0.16*  0.15*   BNPBTYPENAT  2513*   --    --    --              Medical Decision Making, by Problem:  Active Hospital Problems    Diagnosis   • *Acute exacerbation of CHF (congestive heart failure) (CMS-HCC) [I50.9]   • Pulmonary embolus (CMS-[facility]) [I26.99]   • COPD (chronic obstructive pulmonary disease) (CMS-[facility]) [J44.9]   • Dyslipidemia [E78.5]   • Sleep disorder [G47.9]   • Elevated troponin [R74.8]     Assessment/Plan:    1. HFrEF of 30%  - down 900 ml overnight, diuresing well  -LE edema remains  -no runs of  VT  -cont lisinopril, up coreg to 12.5 mg BID, transition IV lasix to 40 mg PO BID  -compression stockings, HF education today  -FU in HF clinic in 2 weeks with APRN, will need sleep apnea workup  -replace K today, transition to potassium 20 mEq bid on discharge    2. PE,   -no DVT on LE duplex  -RA, oxygenating well, able to ambulate with DINERO  -xarelto    3. CAD, non-obstructive  -no angina, med management and follow clinically  -cont bb, lipitor, and ASA    The patient can be discharged today from a cardiology perspective, I recommended he stay one more day for IV to oral lasix transition. He would prefer close outpatient follow up and to go home today. Hospitalist made aware.    Please arrange HFU in HF clinic with APRN or cardiologist in 1-2 weeks.    Reviewed items::  Medications reviewed, Labs reviewed, EKG reviewed and Radiology images reviewed  Degroot catheter::  No Degroot  DVT: xarelto.  DVT prophylaxis - mechanical:  Not indicated at this time, ambulatory  Ulcer Prophylaxis::  Not indicated

## 2017-12-12 NOTE — PROGRESS NOTES
Sheri Alarcon Fall Risk Assessment:     Last Known Fall: No falls  Mobility: Immobilized/requires assist of one person  Medications: Cardiovascular or central nervous system meds, Diuretics  Mental Status/LOC/Awareness: Awake, alert, and oriented to date, place, and person  Toileting Needs: No needs  Volume/Electrolyte Status: No problems  Communication/Sensory: Visual (Glasses)/hearing deficit  Behavior: Appropriate behavior  Sheri Alarcon Fall Risk Total: 10  Fall Risk Level: LOW RISK    Universal Fall Precautions:  call light/belongings in reach, bed in low position and locked, wheelchairs and assistive devices out of sight, siderails up x 2, use non-slip footwear, adequate lighting, clutter free and spill free environment, educate on level of risk, educate to call for assistance    Fall Risk Level Interventions:   TRIAL (TELE 8, NEURO, MED ALEXA 5) Low Fall Risk Interventions  Place yellow fall risk ID band on patient: completed  Provide patient/family education based on risk assessment: completed  Educate patient/family to call staff for assistance when getting out of bed: completed  Place fall precaution signage outside patient door: verified      Patient Specific Interventions:     Medication: review medications with patient and family, assess for medications that can be discontinued or dosage decreased and provide patients who received diuretics/laxatives frequent toileting  Mental Status/LOC/Awareness: reinforce falls education, check on patient hourly, reinforce the use of call light and provide activity  Toileting: provide frquent toileting, monitor intake and output/use of appropriate interventions and instruct patient/family on the need to call for assistance when toileting  Volume/Electrolyte Status: ensure patient remains hydrated  Communication/Sensory: update plan of care on whiteboard  Behavioral: engage patient in daily activities, administer medication as ordered and instruct/reinforce fall  program rationale  Mobility: schedule physical activity throughout the day, ensure bed is locked and in lowest position and provide appropriate assistive device

## 2017-12-12 NOTE — DISCHARGE INSTRUCTIONS
Discharge Instructions    Discharged to home by car with relative. Discharged via wheelchair, hospital escort: Yes.  Special equipment needed: Not Applicable    Be sure to schedule a follow-up appointment with your primary care doctor or any specialists as instructed.     Discharge Plan:   Diet Plan: Discussed  Activity Level: Discussed  Confirmed Follow up Appointment: Appointment Scheduled  Confirmed Symptoms Management: Discussed  Medication Reconciliation Updated: Yes  Pneumococcal Vaccine Given - only chart on this line when given: Given (See MAR)  Influenza Vaccine Indication: Indicated: 65 years and older  Influenza Vaccine Given - only chart on this line when given: Influenza Vaccine Given (See MAR)    I understand that a diet low in cholesterol, fat, and sodium is recommended for good health. Unless I have been given specific instructions below for another diet, I accept this instruction as my diet prescription.   Other diet: Cardiac, 2000 mL fluid restriction per day    Special Instructions:   HF Patient Discharge Instructions  · Monitor your weight daily, and maintain a weight chart, to track your weight changes.   · Activity as tolerated, unless your Doctor has ordered otherwise. Other activity order:  · Follow a low fat, low cholesterol, low salt diet unless instructed otherwise by your Doctor. Read the labels on the back of food products and track your intake of fat, cholesterol and salt.   · Fluid Restriction Yes. If a Fluid Restriction has been ordered by your Doctor, measure fluids with a measuring cup to ensure that you are not exceeding the restriction.   · No smoking.  · Oxygen No. If your Doctor has ordered that you wear Oxygen at home, it is important to wear it as ordered.  · Did you receive an explanation from staff on the importance of taking each of your medications and why it is necessary to keep taking them unless your doctor says to stop? Yes  · Were all of your questions answered about  how to manage your heart failure and what to do if you have increased signs and symptoms after you go home? Yes  · Do you feel like your heart failure care team involved you in the care treatment plan and allowed you to make decisions regarding your care while in the hospital and addressed any discharge needs you might have? Yes    See the educational handout provided at discharge for more information on monitoring your daily weight, activity and diet. This also explains more about Heart Failure, symptoms of a flare-up and some of the tests that you have undergone.     Warning Signs of a Flare-Up include:  · Swelling in the ankles or lower legs.  · Shortness of breath, while at rest, or while doing normal activities.   · Shortness of breath at night when in bed, or coughing in bed.   · Requiring more pillows to sleep at night, or needing to sit up at night to sleep.  · Feeling weak, dizzy or fatigued.     When to call your Doctor:  · Call [facility] seven days a week from 8:00 a.m. to 8:00 p.m. for medical questions [phone].  · Call your Primary Care Physician or Cardiologist if:   1. You experience any pain radiating to your jaw or neck.  2. You have any difficulty breathing.  3. You experience weight gain of 3 lbs in a day or 5 lbs in a week.   4. You feel any palpitations or irregular heartbeats.  5. You become dizzy or lose consciousness.   If you have had an angiogram or had a pacemaker or AICD placed, and experience:  1. Bleeding, drainage or swelling at the surgical / puncture site.  2. Fever greater than 100.0 F  3. Shock from internal defibrillator.  4. Cool and / or numb extremities.      · Is patient discharged on Warfarin / Coumadin?   No     · Is patient Post Blood Transfusion?  No    Depression / Suicide Risk    As you are discharged from this [facility], it is important to learn how to keep safe from harming yourself.    Recognize the warning signs:  · Abrupt changes in  personality, positive or negative- including increase in energy   · Giving away possessions  · Change in eating patterns- significant weight changes-  positive or negative  · Change in sleeping patterns- unable to sleep or sleeping all the time   · Unwillingness or inability to communicate  · Depression  · Unusual sadness, discouragement and loneliness  · Talk of wanting to die  · Neglect of personal appearance   · Rebelliousness- reckless behavior  · Withdrawal from people/activities they love  · Confusion- inability to concentrate     If you or a loved one observes any of these behaviors or has concerns about self-harm, here's what you can do:  · Talk about it- your feelings and reasons for harming yourself  · Remove any means that you might use to hurt yourself (examples: pills, rope, extension cords, firearm)  · Get professional help from the community (Mental Health, Substance Abuse, psychological counseling)  · Do not be alone:Call your Safe Contact- someone whom you trust who will be there for you.  · Call your local CRISIS HOTLINE 672-9032 or 840-800-4785  · Call your local Children's Mobile Crisis Response Team [place] (450) 964-4455 or [facility]  · Call the toll free National Suicide Prevention Hotlines   · National Suicide Prevention Lifeline 821-094-YBGI (3039)  · National Hope Line Network 800-SUICIDE (938-9558)        Heart Failure  Heart failure is a condition in which the heart has trouble pumping blood. This means your heart does not pump blood efficiently for your body to work well. In some cases of heart failure, fluid may back up into your lungs or you may have swelling (edema) in your lower legs. Heart failure is usually a long-term (chronic) condition. It is important for you to take good care of yourself and follow your health care provider's treatment plan.  CAUSES   Some health conditions can cause heart failure. Those health conditions include:  · High blood pressure  (hypertension). Hypertension causes the heart muscle to work harder than normal. When pressure in the blood vessels is high, the heart needs to pump (contract) with more force in order to circulate blood throughout the body. High blood pressure eventually causes the heart to become stiff and weak.  · Coronary artery disease (CAD). CAD is the buildup of cholesterol and fat (plaque) in the arteries of the heart. The blockage in the arteries deprives the heart muscle of oxygen and blood. This can cause chest pain and may lead to a heart attack. High blood pressure can also contribute to CAD.  · Heart attack (myocardial infarction). A heart attack occurs when one or more arteries in the heart become blocked. The loss of oxygen damages the muscle tissue of the heart. When this happens, part of the heart muscle dies. The injured tissue does not contract as well and weakens the heart's ability to pump blood.  · Abnormal heart valves. When the heart valves do not open and close properly, it can cause heart failure. This makes the heart muscle pump harder to keep the blood flowing.  · Heart muscle disease (cardiomyopathy or myocarditis). Heart muscle disease is damage to the heart muscle from a variety of causes. These can include drug or alcohol abuse, infections, or unknown reasons. These can increase the risk of heart failure.  · Lung disease. Lung disease makes the heart work harder because the lungs do not work properly. This can cause a strain on the heart, leading it to fail.  · Diabetes. Diabetes increases the risk of heart failure. High blood sugar contributes to high fat (lipid) levels in the blood. Diabetes can also cause slow damage to tiny blood vessels that carry important nutrients to the heart muscle. When the heart does not get enough oxygen and food, it can cause the heart to become weak and stiff. This leads to a heart that does not contract efficiently.  · Other conditions can contribute to heart failure.  These include abnormal heart rhythms, thyroid problems, and low blood counts (anemia).  Certain unhealthy behaviors can increase the risk of heart failure, including:  · Being overweight.  · Smoking or chewing tobacco.  · Eating foods high in fat and cholesterol.  · Abusing illicit drugs or alcohol.  · Lacking physical activity.  SYMPTOMS   Heart failure symptoms may vary and can be hard to detect. Symptoms may include:  · Shortness of breath with activity, such as climbing stairs.  · Persistent cough.  · Swelling of the feet, ankles, legs, or abdomen.  · Unexplained weight gain.  · Difficulty breathing when lying flat (orthopnea).  · Waking from sleep because of the need to sit up and get more air.  · Rapid heartbeat.  · Fatigue and loss of energy.  · Feeling light-headed, dizzy, or close to fainting.  · Loss of appetite.  · Nausea.  · Increased urination during the night (nocturia).  DIAGNOSIS   A diagnosis of heart failure is based on your history, symptoms, physical examination, and diagnostic tests. Diagnostic tests for heart failure may include:  · Echocardiography.  · Electrocardiography.  · Chest X-ray.  · Blood tests.  · Exercise stress test.  · Cardiac angiography.  · Radionuclide scans.  TREATMENT   Treatment is aimed at managing the symptoms of heart failure. Medicines, behavioral changes, or surgical intervention may be necessary to treat heart failure.  · Medicines to help treat heart failure may include:  ¨ Angiotensin-converting enzyme (ACE) inhibitors. This type of medicine blocks the effects of a blood protein called angiotensin-converting enzyme. ACE inhibitors relax (dilate) the blood vessels and help lower blood pressure.  ¨ Angiotensin receptor blockers (ARBs). This type of medicine blocks the actions of a blood protein called angiotensin. Angiotensin receptor blockers dilate the blood vessels and help lower blood pressure.  ¨ Water pills (diuretics). Diuretics cause the kidneys to remove salt  and water from the blood. The extra fluid is removed through urination. This loss of extra fluid lowers the volume of blood the heart pumps.  ¨ Beta blockers. These prevent the heart from beating too fast and improve heart muscle strength.  ¨ Digitalis. This increases the force of the heartbeat.  · Healthy behavior changes include:  ¨ Obtaining and maintaining a healthy weight.  ¨ Stopping smoking or chewing tobacco.  ¨ Eating heart-healthy foods.  ¨ Limiting or avoiding alcohol.  ¨ Stopping illicit drug use.  ¨ Physical activity as directed by your health care provider.  · Surgical treatment for heart failure may include:  ¨ A procedure to open blocked arteries, repair damaged heart valves, or remove damaged heart muscle tissue.  ¨ A pacemaker to improve heart muscle function and control certain abnormal heart rhythms.  ¨ An internal cardioverter defibrillator to treat certain serious abnormal heart rhythms.  ¨ A left ventricular assist device (LVAD) to assist the pumping ability of the heart.  HOME CARE INSTRUCTIONS   · Take medicines only as directed by your health care provider. Medicines are important in reducing the workload of your heart, slowing the progression of heart failure, and improving your symptoms.  ¨ Do not stop taking your medicine unless directed by your health care provider.  ¨ Do not skip any dose of medicine.  ¨ Refill your prescriptions before you run out of medicine. Your medicines are needed every day.  · Engage in moderate physical activity if directed by your health care provider. Moderate physical activity can benefit some people. The elderly and people with severe heart failure should consult with a health care provider for physical activity recommendations.  · Eat heart-healthy foods. Food choices should be free of trans fat and low in saturated fat, cholesterol, and salt (sodium). Healthy choices include fresh or frozen fruits and vegetables, fish, lean meats, legumes, fat-free or  low-fat dairy products, and whole grain or high fiber foods. Talk to a dietitian to learn more about heart-healthy foods.  · Limit sodium if directed by your health care provider. Sodium restriction may reduce symptoms of heart failure in some people. Talk to a dietitian to learn more about heart-healthy seasonings.  · Use healthy cooking methods. Healthy cooking methods include roasting, grilling, broiling, baking, poaching, steaming, or stir-frying. Talk to a dietitian to learn more about healthy cooking methods.  · Limit fluids if directed by your health care provider. Fluid restriction may reduce symptoms of heart failure in some people.  · Weigh yourself every day. Daily weights are important in the early recognition of excess fluid. You should weigh yourself every morning after you urinate and before you eat breakfast. Wear the same amount of clothing each time you weigh yourself. Record your daily weight. Provide your health care provider with your weight record.  · Monitor and record your blood pressure if directed by your health care provider.  · Check your pulse if directed by your health care provider.  · Lose weight if directed by your health care provider. Weight loss may reduce symptoms of heart failure in some people.  · Stop smoking or chewing tobacco. Nicotine makes your heart work harder by causing your blood vessels to constrict. Do not use nicotine gum or patches before talking to your health care provider.  · Keep all follow-up visits as directed by your health care provider. This is important.  · Limit alcohol intake to no more than 1 drink per day for nonpregnant women and 2 drinks per day for men. One drink equals 12 ounces of beer, 5 ounces of wine, or 1½ ounces of hard liquor. Drinking more than that is harmful to your heart. Tell your health care provider if you drink alcohol several times a week. Talk with your health care provider about whether alcohol is safe for you. If your heart has  already been damaged by alcohol or you have severe heart failure, drinking alcohol should be stopped completely.  · Stop illicit drug use.  · Stay up-to-date with immunizations. It is especially important to prevent respiratory infections through current pneumococcal and influenza immunizations.  · Manage other health conditions such as hypertension, diabetes, thyroid disease, or abnormal heart rhythms as directed by your health care provider.  · Learn to manage stress.  · Plan rest periods when fatigued.  · Learn strategies to manage high temperatures. If the weather is extremely hot:  ¨ Avoid vigorous physical activity.  ¨ Use air conditioning or fans or seek a cooler location.  ¨ Avoid caffeine and alcohol.  ¨ Wear loose-fitting, lightweight, and light-colored clothing.  · Learn strategies to manage cold temperatures. If the weather is extremely cold:  ¨ Avoid vigorous physical activity.  ¨ Layer clothes.  ¨ Wear mittens or gloves, a hat, and a scarf when going outside.  ¨ Avoid alcohol.  · Obtain ongoing education and support as needed.  · Participate in or seek rehabilitation as needed to maintain or improve independence and quality of life.  SEEK MEDICAL CARE IF:   · You have a rapid weight gain.  · You have increasing shortness of breath that is unusual for you.  · You are unable to participate in your usual physical activities.  · You tire easily.  · You cough more than normal, especially with physical activity.  · You have any or more swelling in areas such as your hands, feet, ankles, or abdomen.  · You are unable to sleep because it is hard to breathe.  · You feel like your heart is beating fast (palpitations).  · You become dizzy or light-headed upon standing up.  SEEK IMMEDIATE MEDICAL CARE IF:   · You have difficulty breathing.  · There is a change in mental status such as decreased alertness or difficulty with concentration.  · You have a pain or discomfort in your chest.  · You have an episode of  fainting (syncope).  MAKE SURE YOU:   · Understand these instructions.  · Will watch your condition.  · Will get help right away if you are not doing well or get worse.     This information is not intended to replace advice given to you by your health care provider. Make sure you discuss any questions you have with your health care provider.     Document Released: 12/18/2006 Document Revised: 05/03/2016 Document Reviewed: 01/17/2014  [employer] Interactive Patient Education ©2016 [employer]    Rivaroxaban oral tablets  What is this medicine?  RIVAROXABAN (ri va BETH a ban) is an anticoagulant (blood thinner). It is used to treat blood clots in the lungs or in the veins. It is also used after knee or hip surgeries to prevent blood clots. It is also used to lower the chance of stroke in people with a medical condition called atrial fibrillation.  This medicine may be used for other purposes; ask your health care provider or pharmacist if you have questions.  COMMON BRAND NAME(S): Xarelto  What should I tell my health care provider before I take this medicine?  They need to know if you have any of these conditions:  -bleeding disorders  -bleeding in the brain  -blood in your stools (black or tarry stools) or if you have blood in your vomit  -history of stomach bleeding  -kidney disease  -liver disease  -low blood counts, like low white cell, platelet, or red cell counts  -recent or planned spinal or epidural procedure  -take medicines that treat or prevent blood clots  -an unusual or allergic reaction to rivaroxaban, other medicines, foods, dyes, or preservatives  -pregnant or trying to get pregnant  -breast-feeding  How should I use this medicine?  Take this medicine by mouth with a glass of water. Follow the directions on the prescription label. Take your medicine at regular intervals. Do not take it more often than directed. Do not stop taking except on your doctor's advice.  If you are taking this medicine after  hip or knee replacement surgery, take it with or without food.  If you are taking this medicine for atrial fibrillation, take it with your evening meal. If you are taking this medicine to treat blood clots, take it with food at the same time each day. If you are unable to swallow your tablet, you may crush the tablet and mix it in applesauce. Then, immediately eat the applesauce. You should eat more food right after you eat the applesauce containing the crushed tablet.  Talk to your pediatrician regarding the use of this medicine in children. Special care may be needed.  Overdosage: If you think you've taken too much of this medicine contact a poison control center or emergency room at once.  Overdosage: If you think you have taken too much of this medicine contact a poison control center or emergency room at once.  NOTE: This medicine is only for you. Do not share this medicine with others.  What if I miss a dose?  If you take your medicine once a day and miss a dose, take the missed dose as soon as you remember. If you take your medicine twice a day and miss a dose, take the missed dose immediately. In this instance, 2 tablets may be taken at the same time. The next day you should take 1 tablet twice a day as directed.  What may interact with this medicine?  -aspirin and aspirin-like medicines  -certain antibiotics like erythromycin, azithromycin, and clarithromycin  -certain medicines for fungal infections like ketoconazole and itraconazole  -certain medicines for irregular heart beat like amiodarone, quinidine, dronedarone  -certain medicines for seizures like carbamazepine, phenytoin  -certain medicines that treat or prevent blood clots like warfarin, enoxaparin, and dalteparin   -conivaptan  -diltiazem  -felodipine  -indinavir  -lopinavir; ritonavir  -NSAIDS, medicines for pain and inflammation, like ibuprofen or naproxen  -ranolazine  -rifampin  -ritonavir  -[place]'s wort  -verapamil  This list may not  describe all possible interactions. Give your health care provider a list of all the medicines, herbs, non-prescription drugs, or dietary supplements you use. Also tell them if you smoke, drink alcohol, or use illegal drugs. Some items may interact with your medicine.  What should I watch for while using this medicine?  Do not stop taking this medicine without first talking to your doctor. Stopping this medicine may increase your risk of having a stroke. Be sure to refill your prescription before you run out of medicine.  This medicine may increase your risk to bruise or bleed. Call your doctor or health care professional if you notice any unusual bleeding.  Be careful brushing and flossing your teeth or using a toothpick because you may bleed more easily. If you have any dental work done, tell your dentist you are receiving this medicine.  What side effects may I notice from receiving this medicine?  Side effects that you should report to your doctor or health care professional as soon as possible:  -allergic reactions like skin rash, itching or hives, swelling of the face, lips, or tongue  -back pain  -bloody or black, tarry stools  -changes in vision  -confusion, trouble speaking or understanding  -red or dark-brown urine  -redness, blistering, peeling or loosening of the skin, including inside the mouth  -severe headaches  -spitting up blood or brown material that looks like coffee grounds  -sudden numbness or weakness of the face, arm or leg  -trouble walking, dizziness, loss of balance or coordination  -unusual bruising or bleeding from the eye, gums, or nose   Side effects that usually do not require medical attention (Report these to your doctor or health care professional if they continue or are bothersome.):  -dizziness  -muscle pain  This list may not describe all possible side effects. Call your doctor for medical advice about side effects. You may report side effects to FDA at 0-533-MCO-7898.  Where  should I keep my medicine?  Keep out of the reach of children.  Store at room temperature between 15 and 30 degrees C (59 and 86 degrees F). Throw away any unused medicine after the expiration date.  NOTE: This sheet is a summary. It may not cover all possible information. If you have questions about this medicine, talk to your doctor, pharmacist, or health care provider.  © 2014, [employer]. (3/17/2014 3:32:09 PM)    Fluid Restriction  Some health conditions may require you to restrict your fluid intake. This means that you need to limit the amount of fluid you drink each day. When you have a fluid restriction, you must carefully measure and keep track of the amount of fluid you drink. Your health care provider will identify the specific amount of fluid you are allowed each day. This amount may depend on several things, such as:  · The amount of urine you produce in a day.  · How much fluid you are keeping (retaining) in your body.  · Your blood pressure.  WHAT IS MY PLAN?  Your health care provider recommends that you limit your fluid intake to 2000mL per day.  WHAT COUNTS TOWARD MY FLUID INTAKE?  Your fluid intake includes all liquids that you drink, as well as any foods that become liquid at room temperature.   The following are examples of some fluids that you will have to restrict:  · Tea, coffee, soda, lemonade, milk, water, juice, sport drinks, and nutritional supplement beverages.  · Alcoholic beverages.  · Cream.  · Gravy.  · Ice cubes.  · Soup and broth.  The following are examples of foods that become liquid at room temperature. These foods will also count toward your fluid intake.  · Ice cream and ice milk.  · Frozen yogurt and sherbet.  · Frozen ice pops.  · Flavored gelatin.  HOW DO I KEEP TRACK OF MY FLUID INTAKE?  Each morning, fill a jug with the amount of water that equals the amount of fluid you are allowed for the day. You can use this water as a guideline for fluid allowance. Each  time you take in any form of fluid, including ice cubes and foods that become liquid at room temperature, pour an equal amount of water out of the container. This helps you to see how much fluid you are taking in. It also helps you to see how much of your fluid intake is left for the rest of the day.  The following conversions may also be helpful in measuring your fluid intake:  · 1 cup equals 8 oz (240 mL).  · ¾ cup equals 6 oz (180 mL).  ·  cup equals 5 oz (160 mL).  · ½ cup equals 4 oz (120 mL).  ·  cup equals 2 oz (80 mL).  · ¼ cup equals 2 oz (60 mL).  · 2 Tbsp equals 1 oz (30 mL).  WHAT HOME CARE INSTRUCTIONS SHOULD I FOLLOW WHILE RESTRICTING FLUIDS?  · Make sure that you stay within the recommended limit each day. Always measure and keep track of your fluids, as well as any foods that turn liquid at room temperature.  · Use small cups and glasses and learn to sip fluids slowly.  · Add a slice of fresh lemon or lemon juice to water or ice. This helps to satisfy your thirst.  · Freeze fruit juice or water in an ice cube tray. Use this as part of your fluid allowance. These cubes are useful for quenching your thirst. Measure the amount of liquid in each ice cube prior to freezing so you can subtract this amount from your day's allowance when you consume each frozen cube.  · Try frozen fruits between meals, such as grapes or strawberries.  · Swallow your pills along with meals or soft foods, such as applesauce or mashed potatoes. This helps you to save your fluid allowance for something that you enjoy.  · Weigh yourself every day. Keeping track of your daily weight can help you and your health care provider to notice as soon as possible if you are retaining too much fluid in your body.  ¨ Weigh yourself every morning after you urinate but before you eat breakfast.  ¨ Wear the same amount of clothing each time you weigh yourself.  ¨ Write down your daily weight. Give this weight record to your health care  provider. If your weight is going up, you may be retaining too much fluid. Every 2 cups (480 mL) of fluid retained in the body becomes an extra 1 lb (0.45 kg) of body weight.  · Avoid salty foods. These foods make you thirsty and make fluid control more difficult.  · Brush your teeth often or rinse your mouth with mouthwash to help your dry mouth. Lemon wedges, hard sour candies, chewing gum, or breath spray may also help to moisten your mouth.  · Keep the temperature in your home at a cooler level. Dry air increases thirst, so keep the air in your home as humid as possible.  · Avoid being out in the hot sun, which can cause you to sweat and become thirsty.  WHAT ARE SOME SIGNS THAT I MAY BE TAKING IN TOO MUCH FLUID?  You may be taking in too much fluid if:  · Your weight increases. Contact your health care provider if your weight increases 3 lb or more in a day or if it increases 5 lb or more in a week.    · Your face, hands, legs, feet, and belly (abdomen) start to swell.  · You have trouble breathing.     This information is not intended to replace advice given to you by your health care provider. Make sure you discuss any questions you have with your health care provider.     Document Released: 10/14/2008 Document Revised: 01/08/2016 Document Reviewed: 05/19/2015  [employer] Interactive Patient Education ©2016 [employer]

## 2017-12-12 NOTE — PROGRESS NOTES
Sheri Alarcon Fall Risk Assessment:     Last Known Fall: No falls  Mobility: No limitations  Medications: Cardiovascular or central nervous system meds, Diuretics  Mental Status/LOC/Awareness: Awake, alert, and oriented to date, place, and person  Toileting Needs: No needs  Volume/Electrolyte Status: No problems  Communication/Sensory: Visual (Glasses)/hearing deficit  Behavior: Appropriate behavior  Sheri Alarcon Fall Risk Total: 8  Fall Risk Level: LOW RISK    Universal Fall Precautions:  call light/belongings in reach, bed in low position and locked, wheelchairs and assistive devices out of sight, siderails up x 2, use non-slip footwear, adequate lighting, clutter free and spill free environment, educate on level of risk, educate to call for assistance    Fall Risk Level Interventions:   TRIAL (TELE 8, NEURO, MED ALEXA 5) Low Fall Risk Interventions  Place yellow fall risk ID band on patient: refused  Provide patient/family education based on risk assessment: verified  Educate patient/family to call staff for assistance when getting out of bed: verified  Place fall precaution signage outside patient door: verified      Patient Specific Interventions:     Medication: review medications with patient and family  Mental Status/LOC/Awareness: reinforce falls education, check on patient hourly, utilize bed/chair fall alarm and reinforce the use of call light  Toileting: instruct male patients prone to dizziness to void while sitting  Volume/Electrolyte Status: ensure patient remains hydrated, monitor abnormal lab values and ensure IV fluids are appropriate  Communication/Sensory: update plan of care on whiteboard  Behavioral: encourage patient to voice feelings and instruct/reinforce fall program rationale  Mobility: utilize bed/chair fall alarm, ensure bed is locked and in lowest position and provide appropriate assistive device

## 2017-12-13 ENCOUNTER — PATIENT OUTREACH (OUTPATIENT)
Dept: HEALTH INFORMATION MANAGEMENT | Facility: OTHER | Age: 72
End: 2017-12-13

## 2017-12-13 NOTE — DISCHARGE SUMMARY
"CHIEF COMPLAINT ON ADMISSION  Chief Complaint   Patient presents with   • Congestion     no relief with mucinex - not wheezing   • Hoarse   • Abdominal Pain     \"upset\" x 3-4 days       CODE STATUS  Prior    HPI & HOSPITAL COURSE  This is a 72 y.o. male here with Acute respiratory failure secondary to congestive heart failure, pulmonary embolus, as well as abdominal pain. CT of the chest done at presentation showed nonocclusive right lower lobe segmental pulmonary embolus with no evidence of right heart strain. Left ventricular enlargement was also noted. Patient was started on anticoagulation with Xarelto, some issue with his ability to afford the medication, samples were obtained for the patient to the [facility], he is able to afford the ongoing prescription of approximately $100 per month. Patient has been seen at the [facility], currently known as [facility]- however he was unhappy there. I explained that Dr. Stevens would not be willing to follow him for any issues other than cardiac issues. He needs to follow-up with the clinic to monitor his anticoagulation, as well as evaluate for other issues such as sleep-disorders. Patient has a BMI of 32, and his RV pressure is elevated at 44. Patient's CHF was treated and his symptoms resolved, he was anxious for discharge.  We discussed medication changes and follow-up in detail at the time of discharge.    Therefore, he is discharged in good and stable condition with close outpatient follow-up.    SPECIFIC OUTPATIENT FOLLOW-UP  Cardiology- CHF  Establish at [facility]    DISCHARGE PROBLEM LIST  Principal Problem:    Acute on chronic systolic CHF (congestive heart failure) (CMS-HCC) POA: Yes  Active Problems:    Elevated troponin POA: Yes    Dyslipidemia POA: Yes    COPD (chronic obstructive pulmonary disease) (CMS-HCC) POA: Yes    Sleep disorder POA: Yes    Non-ischemic cardiomyopathy (CMS-HCC) POA: Yes    Pulmonary " embolus (CMS-HCC) POA: Yes    Hypokalemia POA: No  Resolved Problems:    * No resolved hospital problems. *      FOLLOW UP  Future Appointments  Date Time Provider Department Center   12/21/2017 3:45 PM Lauri Booth M.D. [facility] None     [address]  [place] [address]  [phone]  Go on 12/12/2017  Please go for a walk in visit at 8 Am . Please bring a phont ID and proof of address and income.  Thank you.     Vasiliy Reina M.D.  [address]  [address]  [facility] [address]  [phone]            MEDICATIONS ON DISCHARGE   Vasiliy Rm   Home Medication Instructions KENNY:[id number]    Printed on:12/13/17 0936   Medication Information                      aspirin 81 MG tablet  Take 81 mg by mouth every morning.             atorvastatin (LIPITOR) 40 MG Tab  Take 1 Tab by mouth every day.             carvedilol (COREG) 12.5 MG Tab  Take 1 Tab by mouth 2 times a day, with meals.             furosemide (LASIX) 80 MG Tab  Take 40 mg by mouth every morning.             guaifenesin LA (MUCINEX) 600 MG TABLET SR 12 HR  Take 600 mg by mouth 2 times a day as needed for Cough.             lisinopril (PRINIVIL) 20 MG Tab  Take 1 Tab by mouth every day.             potassium chloride SA (KDUR) 20 MEQ Tab CR  Take 1 Tab by mouth every day.             rivaroxaban (XARELTO) 15 MG Tab tablet  Take 1 Tab by mouth 2 Times a Day for 20 days. For 3 weeks, and then switch to the 20mg strength. On 1/2/2018             rivaroxaban (XARELTO) 20 MG Tab tablet  Take 1 Tab by mouth every day. STARTING 1/2/2018                 DIET  Low-sodium    ACTIVITY  As tolerated      CONSULTATIONS  Cardiology    ECHO  FINDINGS  Left Ventricle  Normal left ventricular chamber size. Mild left ventricular   hypertrophy. Severely reduced left ventricular systolic function. Left   ventricular ejection fraction is visually estimated to be 30%. Global   hypokinesis. A reliable estimation of diastolic  function cannot be made   due to conflicting data.    Right Ventricle  The right ventricle was normal in size and function.    Right Atrium  Normal right atrial size. Dilated inferior vena cava with inspiratory   collapse.    Left Atrium  Mildly dilated left atrium. Left atrial volume index is 42 mL/sq m.    Mitral Valve  Structurally normal mitral valve without significant stenosis. Moderate   mitral regurgitation.    Aortic Valve  Aortic sclerosis without stenosis. Trace aortic insufficiency.    Tricuspid Valve  Structurally normal tricuspid valve without significant stenosis. Mild   tricuspid regurgitation. Right ventricular systolic pressure is   estimated to be 44 mmHg.    Pulmonic Valve  The pulmonic valve is not well visualized.  Mild pulmonic   insufficiency.    Pericardium  Normal pericardium without effusion. Fat pad present.    Aorta  The aortic root is normal.      LABORATORY  Lab Results   Component Value Date/Time    SODIUM 141 12/12/2017 03:16 AM    POTASSIUM 3.3 (L) 12/12/2017 03:16 AM    CHLORIDE 110 12/12/2017 03:16 AM    CO2 22 12/12/2017 03:16 AM    GLUCOSE 161 (H) 12/12/2017 03:16 AM    BUN 28 (H) 12/12/2017 03:16 AM    CREATININE 1.03 12/12/2017 03:16 AM        Lab Results   Component Value Date/Time    WBC 7.2 12/12/2017 03:16 AM    HEMOGLOBIN 14.4 12/12/2017 03:16 AM    HEMATOCRIT 43.5 12/12/2017 03:16 AM    PLATELETCT 125 (L) 12/12/2017 03:16 AM        Total time of the discharge process exceeds 40 minutes

## 2017-12-13 NOTE — PROGRESS NOTES
Pt dc'd home. IV and monitor removed; monitor room notified. Pt left unit via wheelchair by this RN as transport; wife with pt. Personal belongings with pt when leaving unit. Pt given discharge instructions prior to leaving unit including where to  prescription and when to visit with physician--follow up tomorrow with [facility]; verbalized understanding. Copy of discharge instructions with pt and in the chart.

## 2017-12-14 ENCOUNTER — PATIENT OUTREACH (OUTPATIENT)
Dept: HEALTH INFORMATION MANAGEMENT | Facility: OTHER | Age: 72
End: 2017-12-14

## 2017-12-19 ENCOUNTER — TELEPHONE (OUTPATIENT)
Dept: VASCULAR LAB | Facility: MEDICAL CENTER | Age: 72
End: 2017-12-19

## 2017-12-19 NOTE — TELEPHONE ENCOUNTER
Left VM for pt regarding referral to anticoagulation. Asked pt to please call back to establish care.     Kristy Sagastume, FaridehD

## 2017-12-28 ENCOUNTER — TELEPHONE (OUTPATIENT)
Dept: VASCULAR LAB | Facility: MEDICAL CENTER | Age: 72
End: 2017-12-28

## 2017-12-28 NOTE — TELEPHONE ENCOUNTER
[facility]     for pt to call clinic and establish care.    Quinn Murillo, PharmD

## 2018-08-14 ENCOUNTER — APPOINTMENT (OUTPATIENT)
Dept: SLEEP MEDICINE | Facility: MEDICAL CENTER | Age: 73
End: 2018-08-14
Payer: MEDICARE

## 2018-08-31 ENCOUNTER — HOSPITAL ENCOUNTER (OUTPATIENT)
Dept: LAB | Facility: MEDICAL CENTER | Age: 73
End: 2018-08-31
Attending: FAMILY MEDICINE
Payer: MEDICARE

## 2018-08-31 LAB
ALBUMIN SERPL BCP-MCNC: 4.1 G/DL (ref 3.2–4.9)
ALBUMIN/GLOB SERPL: 1.6 G/DL
ALP SERPL-CCNC: 148 U/L (ref 30–99)
ALT SERPL-CCNC: 37 U/L (ref 2–50)
ANION GAP SERPL CALC-SCNC: 17 MMOL/L (ref 0–11.9)
AST SERPL-CCNC: 20 U/L (ref 12–45)
BASOPHILS # BLD AUTO: 0.8 % (ref 0–1.8)
BASOPHILS # BLD: 0.08 K/UL (ref 0–0.12)
BILIRUB SERPL-MCNC: 2.1 MG/DL (ref 0.1–1.5)
BUN SERPL-MCNC: 84 MG/DL (ref 8–22)
CALCIUM SERPL-MCNC: 9.5 MG/DL (ref 8.5–10.5)
CHLORIDE SERPL-SCNC: 100 MMOL/L (ref 96–112)
CO2 SERPL-SCNC: 20 MMOL/L (ref 20–33)
CREAT SERPL-MCNC: 2.58 MG/DL (ref 0.5–1.4)
DHEA-S SERPL-MCNC: 22.8 UG/DL (ref 33.6–249)
EOSINOPHIL # BLD AUTO: 0.12 K/UL (ref 0–0.51)
EOSINOPHIL NFR BLD: 1.3 % (ref 0–6.9)
ERYTHROCYTE [DISTWIDTH] IN BLOOD BY AUTOMATED COUNT: 48.1 FL (ref 35.9–50)
ESTRADIOL SERPL-MCNC: 189 PG/ML
GLOBULIN SER CALC-MCNC: 2.6 G/DL (ref 1.9–3.5)
GLUCOSE SERPL-MCNC: 138 MG/DL (ref 65–99)
HCT VFR BLD AUTO: 52.7 % (ref 42–52)
HCYS SERPL-SCNC: 16.51 UMOL/L
HGB BLD-MCNC: 16.7 G/DL (ref 14–18)
IMM GRANULOCYTES # BLD AUTO: 0.04 K/UL (ref 0–0.11)
IMM GRANULOCYTES NFR BLD AUTO: 0.4 % (ref 0–0.9)
LYMPHOCYTES # BLD AUTO: 2.63 K/UL (ref 1–4.8)
LYMPHOCYTES NFR BLD: 27.8 % (ref 22–41)
MCH RBC QN AUTO: 29 PG (ref 27–33)
MCHC RBC AUTO-ENTMCNC: 31.7 G/DL (ref 33.7–35.3)
MCV RBC AUTO: 91.7 FL (ref 81.4–97.8)
MONOCYTES # BLD AUTO: 0.58 K/UL (ref 0–0.85)
MONOCYTES NFR BLD AUTO: 6.1 % (ref 0–13.4)
NEUTROPHILS # BLD AUTO: 6.01 K/UL (ref 1.82–7.42)
NEUTROPHILS NFR BLD: 63.6 % (ref 44–72)
NRBC # BLD AUTO: 0 K/UL
NRBC BLD-RTO: 0 /100 WBC
PLATELET # BLD AUTO: 259 K/UL (ref 164–446)
PMV BLD AUTO: 11.9 FL (ref 9–12.9)
POTASSIUM SERPL-SCNC: 4.8 MMOL/L (ref 3.6–5.5)
PROT SERPL-MCNC: 6.7 G/DL (ref 6–8.2)
RBC # BLD AUTO: 5.75 M/UL (ref 4.7–6.1)
SODIUM SERPL-SCNC: 137 MMOL/L (ref 135–145)
T3FREE SERPL-MCNC: 2 PG/ML (ref 2.4–4.2)
T4 FREE SERPL-MCNC: 1.01 NG/DL (ref 0.53–1.43)
THYROPEROXIDASE AB SERPL-ACNC: 1.2 IU/ML (ref 0–9)
TSH SERPL DL<=0.005 MIU/L-ACNC: 3.76 UIU/ML (ref 0.38–5.33)
WBC # BLD AUTO: 9.5 K/UL (ref 4.8–10.8)

## 2018-08-31 PROCEDURE — 84482 T3 REVERSE: CPT

## 2018-08-31 PROCEDURE — 86800 THYROGLOBULIN ANTIBODY: CPT

## 2018-08-31 PROCEDURE — 84305 ASSAY OF SOMATOMEDIN: CPT

## 2018-08-31 PROCEDURE — 36415 COLL VENOUS BLD VENIPUNCTURE: CPT

## 2018-08-31 PROCEDURE — 82670 ASSAY OF TOTAL ESTRADIOL: CPT

## 2018-08-31 PROCEDURE — 84443 ASSAY THYROID STIM HORMONE: CPT

## 2018-08-31 PROCEDURE — 80053 COMPREHEN METABOLIC PANEL: CPT

## 2018-08-31 PROCEDURE — 85025 COMPLETE CBC W/AUTO DIFF WBC: CPT

## 2018-08-31 PROCEDURE — 84270 ASSAY OF SEX HORMONE GLOBUL: CPT

## 2018-08-31 PROCEDURE — 82627 DEHYDROEPIANDROSTERONE: CPT

## 2018-08-31 PROCEDURE — 86376 MICROSOMAL ANTIBODY EACH: CPT

## 2018-08-31 PROCEDURE — 84481 FREE ASSAY (FT-3): CPT

## 2018-08-31 PROCEDURE — 84403 ASSAY OF TOTAL TESTOSTERONE: CPT

## 2018-08-31 PROCEDURE — 83525 ASSAY OF INSULIN: CPT

## 2018-08-31 PROCEDURE — 83090 ASSAY OF HOMOCYSTEINE: CPT | Mod: GA

## 2018-08-31 PROCEDURE — 84439 ASSAY OF FREE THYROXINE: CPT

## 2018-09-02 LAB
IGF-I SERPL-MCNC: 67 NG/ML (ref 27–246)
IGF-I Z-SCORE SERPL: -1.1
INSULIN P FAST SERPL-ACNC: 2 UIU/ML (ref 3–19)
SHBG SERPL-SCNC: 85 NMOL/L (ref 11–80)
TESTOST FREE MFR SERPL: 0.9 % (ref 1.6–2.9)
TESTOST FREE SERPL-MCNC: 11 PG/ML (ref 47–244)
TESTOST SERPL-MCNC: 119 NG/DL (ref 300–720)
THYROGLOB AB SERPL-ACNC: <0.9 IU/ML (ref 0–4)

## 2018-09-04 LAB — T3REVERSE SERPL-MCNC: 64.2 NG/DL (ref 9–27)

## 2023-09-30 NOTE — PROGRESS NOTES
Bedside report received. POC discussed with pt; all questions answered at this time. Pt at edge of bed; call light within reach and bed in lowest position.     Parkinson's disease dementia Parkinson's disease dementia Parkinson's disease dementia